# Patient Record
Sex: MALE | Race: BLACK OR AFRICAN AMERICAN | NOT HISPANIC OR LATINO | Employment: FULL TIME | URBAN - METROPOLITAN AREA
[De-identification: names, ages, dates, MRNs, and addresses within clinical notes are randomized per-mention and may not be internally consistent; named-entity substitution may affect disease eponyms.]

---

## 2021-05-10 ENCOUNTER — OFFICE VISIT (OUTPATIENT)
Dept: INTERNAL MEDICINE | Facility: CLINIC | Age: 41
End: 2021-05-10

## 2021-05-10 VITALS
HEART RATE: 76 BPM | TEMPERATURE: 97.3 F | DIASTOLIC BLOOD PRESSURE: 96 MMHG | HEIGHT: 70 IN | BODY MASS INDEX: 45.1 KG/M2 | SYSTOLIC BLOOD PRESSURE: 148 MMHG | OXYGEN SATURATION: 96 % | WEIGHT: 315 LBS

## 2021-05-10 DIAGNOSIS — Z00.00 PHYSICAL EXAM, ANNUAL: Primary | ICD-10-CM

## 2021-05-10 DIAGNOSIS — E66.01 MORBID (SEVERE) OBESITY DUE TO EXCESS CALORIES (HCC): ICD-10-CM

## 2021-05-10 DIAGNOSIS — I10 ESSENTIAL HYPERTENSION: ICD-10-CM

## 2021-05-10 DIAGNOSIS — Z11.59 NEED FOR HEPATITIS C SCREENING TEST: ICD-10-CM

## 2021-05-10 PROBLEM — J30.2 SEASONAL ALLERGIC RHINITIS: Status: ACTIVE | Noted: 2021-05-10

## 2021-05-10 PROBLEM — K62.5 RECTAL BLEEDING: Status: ACTIVE | Noted: 2020-06-23

## 2021-05-10 PROCEDURE — 99386 PREV VISIT NEW AGE 40-64: CPT | Performed by: FAMILY MEDICINE

## 2021-05-10 RX ORDER — LISINOPRIL 10 MG/1
10 TABLET ORAL DAILY
Qty: 30 TABLET | Refills: 1 | Status: SHIPPED | OUTPATIENT
Start: 2021-05-10 | End: 2021-06-12 | Stop reason: SDUPTHER

## 2021-05-10 NOTE — PATIENT INSTRUCTIONS
"https://www.nhlbi.nih.gov/files/docs/public/heart/dash_brief.pdf\">   DASH Eating Plan  DASH stands for Dietary Approaches to Stop Hypertension. The DASH eating plan is a healthy eating plan that has been shown to:  · Reduce high blood pressure (hypertension).  · Reduce your risk for type 2 diabetes, heart disease, and stroke.  · Help with weight loss.  What are tips for following this plan?  Reading food labels  · Check food labels for the amount of salt (sodium) per serving. Choose foods with less than 5 percent of the Daily Value of sodium. Generally, foods with less than 300 milligrams (mg) of sodium per serving fit into this eating plan.  · To find whole grains, look for the word \"whole\" as the first word in the ingredient list.  Shopping  · Buy products labeled as \"low-sodium\" or \"no salt added.\"  · Buy fresh foods. Avoid canned foods and pre-made or frozen meals.  Cooking  · Avoid adding salt when cooking. Use salt-free seasonings or herbs instead of table salt or sea salt. Check with your health care provider or pharmacist before using salt substitutes.  · Do not hooks foods. Cook foods using healthy methods such as baking, boiling, grilling, roasting, and broiling instead.  · Cook with heart-healthy oils, such as olive, canola, avocado, soybean, or sunflower oil.  Meal planning    · Eat a balanced diet that includes:  ? 4 or more servings of fruits and 4 or more servings of vegetables each day. Try to fill one-half of your plate with fruits and vegetables.  ? 6-8 servings of whole grains each day.  ? Less than 6 oz (170 g) of lean meat, poultry, or fish each day. A 3-oz (85-g) serving of meat is about the same size as a deck of cards. One egg equals 1 oz (28 g).  ? 2-3 servings of low-fat dairy each day. One serving is 1 cup (237 mL).  ? 1 serving of nuts, seeds, or beans 5 times each week.  ? 2-3 servings of heart-healthy fats. Healthy fats called omega-3 fatty acids are found in foods such as walnuts, " flaxseeds, fortified milks, and eggs. These fats are also found in cold-water fish, such as sardines, salmon, and mackerel.  · Limit how much you eat of:  ? Canned or prepackaged foods.  ? Food that is high in trans fat, such as some fried foods.  ? Food that is high in saturated fat, such as fatty meat.  ? Desserts and other sweets, sugary drinks, and other foods with added sugar.  ? Full-fat dairy products.  · Do not salt foods before eating.  · Do not eat more than 4 egg yolks a week.  · Try to eat at least 2 vegetarian meals a week.  · Eat more home-cooked food and less restaurant, buffet, and fast food.  Lifestyle  · When eating at a restaurant, ask that your food be prepared with less salt or no salt, if possible.  · If you drink alcohol:  ? Limit how much you use to:  § 0-1 drink a day for women who are not pregnant.  § 0-2 drinks a day for men.  ? Be aware of how much alcohol is in your drink. In the [place] one drink equals one 12 oz bottle of beer (355 mL), one 5 oz glass of wine (148 mL), or one 1½ oz glass of hard liquor (44 mL).  General information  · Avoid eating more than 2,300 mg of salt a day. If you have hypertension, you may need to reduce your sodium intake to 1,500 mg a day.  · Work with your health care provider to maintain a healthy body weight or to lose weight. Ask what an ideal weight is for you.  · Get at least 30 minutes of exercise that causes your heart to beat faster (aerobic exercise) most days of the week. Activities may include walking, swimming, or biking.  · Work with your health care provider or dietitian to adjust your eating plan to your individual calorie needs.  What foods should I eat?  Fruits  All fresh, dried, or frozen fruit. Canned fruit in natural juice (without added sugar).  Vegetables  Fresh or frozen vegetables (raw, steamed, roasted, or grilled). Low-sodium or reduced-sodium tomato and vegetable juice. Low-sodium or reduced-sodium tomato sauce and tomato paste.  Low-sodium or reduced-sodium canned vegetables.  Grains  Whole-grain or whole-wheat bread. Whole-grain or whole-wheat pasta. Brown rice. Oatmeal. Quinoa. Bulgur. Whole-grain and low-sodium cereals. Clara bread. Low-fat, low-sodium crackers. Whole-wheat flour tortillas.  Meats and other proteins  Skinless chicken or turkey. Ground chicken or turkey. Pork with fat trimmed off. Fish and seafood. Egg whites. Dried beans, peas, or lentils. Unsalted nuts, nut butters, and seeds. Unsalted canned beans. Lean cuts of beef with fat trimmed off. Low-sodium, lean precooked or cured meat, such as sausages or meat loaves.  Dairy  Low-fat (1%) or fat-free (skim) milk. Reduced-fat, low-fat, or fat-free cheeses. Nonfat, low-sodium ricotta or cottage cheese. Low-fat or nonfat yogurt. Low-fat, low-sodium cheese.  Fats and oils  Soft margarine without trans fats. Vegetable oil. Reduced-fat, low-fat, or light mayonnaise and salad dressings (reduced-sodium). Canola, safflower, olive, avocado, soybean, and sunflower oils. Avocado.  Seasonings and condiments  Herbs. Spices. Seasoning mixes without salt.  Other foods  Unsalted popcorn and pretzels. Fat-free sweets.  The items listed above may not be a complete list of foods and beverages you can eat. Contact a dietitian for more information.  What foods should I avoid?  Fruits  Canned fruit in a light or heavy syrup. Fried fruit. Fruit in cream or butter sauce.  Vegetables  Creamed or fried vegetables. Vegetables in a cheese sauce. Regular canned vegetables (not low-sodium or reduced-sodium). Regular canned tomato sauce and paste (not low-sodium or reduced-sodium). Regular tomato and vegetable juice (not low-sodium or reduced-sodium). Pickles. Olives.  Grains  Baked goods made with fat, such as croissants, muffins, or some breads. Dry pasta or rice meal packs.  Meats and other proteins  Fatty cuts of meat. Ribs. Fried meat. Melton. Bologna, salami, and other precooked or cured meats, such as  sausages or meat loaves. Fat from the back of a pig (fatback). Bratwurst. Salted nuts and seeds. Canned beans with added salt. Canned or smoked fish. Whole eggs or egg yolks. Chicken or turkey with skin.  Dairy  Whole or 2% milk, cream, and half-and-half. Whole or full-fat cream cheese. Whole-fat or sweetened yogurt. Full-fat cheese. Nondairy creamers. Whipped toppings. Processed cheese and cheese spreads.  Fats and oils  Butter. Stick margarine. Lard. Shortening. Ghee. Melton fat. Tropical oils, such as coconut, palm kernel, or palm oil.  Seasonings and condiments  Onion salt, garlic salt, seasoned salt, table salt, and sea salt. Worcestershire sauce. Tartar sauce. Barbecue sauce. Teriyaki sauce. Soy sauce, including reduced-sodium. Steak sauce. Canned and packaged gravies. Fish sauce. Oyster sauce. Cocktail sauce. Store-bought horseradish. Ketchup. Mustard. Meat flavorings and tenderizers. Bouillon cubes. Hot sauces. Pre-made or packaged marinades. Pre-made or packaged taco seasonings. Relishes. Regular salad dressings.  Other foods  Salted popcorn and pretzels.  The items listed above may not be a complete list of foods and beverages you should avoid. Contact a dietitian for more information.  Where to find more information  · National Heart, Lung, and Blood [place]: www.nhlbi.nih.gov  · American Heart Association: www.heart.org  · Academy of Nutrition and Dietetics: www.eatright.org  · National Kidney Foundation: www.kidney.org  Summary  · The DASH eating plan is a healthy eating plan that has been shown to reduce high blood pressure (hypertension). It may also reduce your risk for type 2 diabetes, heart disease, and stroke.  · When on the DASH eating plan, aim to eat more fresh fruits and vegetables, whole grains, lean proteins, low-fat dairy, and heart-healthy fats.  · With the DASH eating plan, you should limit salt (sodium) intake to 2,300 mg a day. If you have hypertension, you may need to reduce your  sodium intake to 1,500 mg a day.  · Work with your health care provider or dietitian to adjust your eating plan to your individual calorie needs.  This information is not intended to replace advice given to you by your health care provider. Make sure you discuss any questions you have with your health care provider.  Document Revised: 11/20/2020 Document Reviewed: 11/20/2020  [facility] Patient Education © 2021 [employer]

## 2021-05-10 NOTE — PROGRESS NOTES
Subjective   Farzad Collins is a 41 y.o. male.   Chief Complaint   Patient presents with   • Allergic Rhinitis   • Annual Exam       History of Present Illness     This is a new patient in our office.  He is here for complete physical exam.      #1 CPE-he has no complaints.    He has a history of rectal bleeding.  He had colonoscopy in July 2020 done.  It was positive for internal hemorrhoids.  Patient says that as long as he keeps high-fiber diet he has no problems.  Allergic rhinitis-he has seasonal allergies.  Symptoms are worse in spring and fall.  He uses Zyrtec and Flonase as needed seasonally.  He takes no prescription medications  Over-the-counter he takes fiber pills, Zyrtec and Flonase.  He is not allergic to any medications.  He does not use tobacco products.  He does not drink alcohol.  He used to drink heavily in his 20s.  He does not do it since he is 25.  He does not use drugs.  He does not exercise regularly.  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  Dental appointments every 6 months.  Last eye exam was in 2020.  He wears glasses.    He had tetanus vaccine in 2019.  Colonoscopy July 2020.    2.  Hypertension-he has no history of hypertension.  He was told that his blood pressure was elevated when he had colonoscopy in July 2020.  Blood pressure is at 148/96.  No chest pain, no shortness of breath, no lightheadedness.    For more history please see health history form which was reviewed by me and will be scanned.    The following portions of the patient's history were reviewed and updated as appropriate: allergies, current medications, past family history, past medical history, past social history, past surgical history and problem list.    Review of Systems   Constitutional: Negative for chills and fever.   Respiratory: Negative for cough and shortness of breath.    Cardiovascular: Negative for chest pain and palpitations.   Genitourinary: Negative for hematuria.   Neurological: Negative  for light-headedness.   Psychiatric/Behavioral: Negative.          Objective   Wt Readings from Last 3 Encounters:   05/10/21 (!) 143 kg (315 lb)      Vitals:    05/10/21 0925   BP: 148/96   Pulse: 76   Temp: 97.3 °F (36.3 °C)   SpO2: 96%     Temp Readings from Last 3 Encounters:   05/10/21 97.3 °F (36.3 °C)     BP Readings from Last 3 Encounters:   05/10/21 148/96     Pulse Readings from Last 3 Encounters:   05/10/21 76     Body mass index is 45.2 kg/m².    Physical Exam  Vitals reviewed.   Constitutional:       General: He is not in acute distress.     Appearance: He is well-developed. He is obese. He is not diaphoretic.   HENT:      Head: Normocephalic and atraumatic. Hair is normal.      Right Ear: Hearing, tympanic membrane, ear canal and external ear normal.      Left Ear: Hearing, tympanic membrane, ear canal and external ear normal.   Eyes:      General: Lids are normal. No scleral icterus.        Right eye: No discharge.         Left eye: No discharge.      Extraocular Movements:      Right eye: Normal extraocular motion and no nystagmus.      Left eye: Normal extraocular motion and no nystagmus.      Conjunctiva/sclera: Conjunctivae normal.      Pupils: Pupils are equal, round, and reactive to light.   Neck:      Thyroid: No thyromegaly.      Vascular: No JVD.   Cardiovascular:      Rate and Rhythm: Normal rate and regular rhythm.      Pulses: Normal pulses.      Heart sounds: Normal heart sounds. No murmur heard.   No gallop.    Pulmonary:      Effort: Pulmonary effort is normal. No respiratory distress.      Breath sounds: Normal breath sounds. No wheezing or rales.   Chest:      Chest wall: No tenderness.   Abdominal:      General: There is no distension.      Palpations: Abdomen is soft. There is no mass.      Tenderness: There is no abdominal tenderness. There is no guarding.      Hernia: No hernia is present. There is no hernia in the left inguinal area or right inguinal area.   Genitourinary:      Testes:         Right: Mass, tenderness or swelling not present.         Left: Mass, tenderness or swelling not present.   Musculoskeletal:         General: No tenderness or deformity. Normal range of motion.      Cervical back: Normal range of motion and neck supple.   Lymphadenopathy:      Cervical: No cervical adenopathy.      Lower Body: No right inguinal adenopathy. No left inguinal adenopathy.   Skin:     General: Skin is warm and dry.      Findings: No rash.   Neurological:      Mental Status: He is alert and oriented to person, place, and time.      Cranial Nerves: No cranial nerve deficit.      Motor: No abnormal muscle tone.      Coordination: Coordination normal.      Deep Tendon Reflexes: Reflexes are normal and symmetric. Reflexes normal.   Psychiatric:         Behavior: Behavior normal.         Thought Content: Thought content normal.         Judgment: Judgment normal.         Assessment/Plan   Diagnoses and all orders for this visit:    1. Physical exam, annual (Primary)  -     CBC (No Diff)  -     Comprehensive Metabolic Panel  -     Lipid Panel With LDL / HDL Ratio  -     Thyroid Cascade Profile  -     Urinalysis With Microscopic If Indicated (No Culture) - Urine, Clean Catch  -     Vitamin D 25 Hydroxy    2. Essential hypertension    3. Morbid (severe) obesity due to excess calories (CMS/HCC)    4. Need for hepatitis C screening test  -     Hepatitis C Antibody    Other orders  -     lisinopril (PRINIVIL,ZESTRIL) 10 MG tablet; Take 1 tablet by mouth Daily.  Dispense: 30 tablet; Refill: 1        #1 CPE-preventing counseling provided.  We are ordering labs.  Patient is up-to-date with cancer screening.  His major risk factor is morbid obesity.  It increases risk for developing diabetes and heart disease.  Patient is advised to stop keto diet.  It is not healthy long term and he tried it many times losing and gaining weight.  He was never able to maintain it.  I recommend that he joints weight  watchers.  Dietitian offered.  He is advised to start to exercise at least 30 minutes a day, 5 days a week.  Continue regular dental appointments every 6 months.    2.  Hypertension-new and uncontrolled problem.  I rechecked his blood pressure at the end of the visit and it stayed elevated.  We are starting lisinopril at 10 mg a day.  Side effects discussed.  Information DASH diet provided.  Weight loss will be beneficial.  Follow-up in 1 month.  Will need EKG at next office visit.    Both patient and I were wearing masks throughout the whole appointment.

## 2021-05-11 LAB
25(OH)D3+25(OH)D2 SERPL-MCNC: 17.7 NG/ML (ref 30–100)
ALBUMIN SERPL-MCNC: 4.1 G/DL (ref 3.5–5.2)
ALBUMIN/GLOB SERPL: 1.6 G/DL
ALP SERPL-CCNC: 84 U/L (ref 39–117)
ALT SERPL-CCNC: 21 U/L (ref 1–41)
APPEARANCE UR: CLEAR
AST SERPL-CCNC: 14 U/L (ref 1–40)
BILIRUB SERPL-MCNC: 0.4 MG/DL (ref 0–1.2)
BILIRUB UR QL STRIP: NEGATIVE
BUN SERPL-MCNC: 10 MG/DL (ref 6–20)
BUN/CREAT SERPL: 10.1 (ref 7–25)
CALCIUM SERPL-MCNC: 9.1 MG/DL (ref 8.6–10.5)
CHLORIDE SERPL-SCNC: 103 MMOL/L (ref 98–107)
CHOLEST SERPL-MCNC: 175 MG/DL (ref 0–200)
CO2 SERPL-SCNC: 23.2 MMOL/L (ref 22–29)
COLOR UR: ABNORMAL
CREAT SERPL-MCNC: 0.99 MG/DL (ref 0.76–1.27)
ERYTHROCYTE [DISTWIDTH] IN BLOOD BY AUTOMATED COUNT: 13.2 % (ref 12.3–15.4)
GLOBULIN SER CALC-MCNC: 2.6 GM/DL
GLUCOSE SERPL-MCNC: 95 MG/DL (ref 65–99)
GLUCOSE UR QL: NEGATIVE
HCT VFR BLD AUTO: 47.6 % (ref 37.5–51)
HCV AB S/CO SERPL IA: <0.1 S/CO RATIO (ref 0–0.9)
HDLC SERPL-MCNC: 36 MG/DL (ref 40–60)
HGB BLD-MCNC: 15.7 G/DL (ref 13–17.7)
HGB UR QL STRIP: NEGATIVE
KETONES UR QL STRIP: NEGATIVE
LDLC SERPL CALC-MCNC: 126 MG/DL (ref 0–100)
LDLC/HDLC SERPL: 3.49 {RATIO}
LEUKOCYTE ESTERASE UR QL STRIP: NEGATIVE
MCH RBC QN AUTO: 27.9 PG (ref 26.6–33)
MCHC RBC AUTO-ENTMCNC: 33 G/DL (ref 31.5–35.7)
MCV RBC AUTO: 84.7 FL (ref 79–97)
NITRITE UR QL STRIP: NEGATIVE
PH UR STRIP: 5.5 [PH] (ref 5–8)
PLATELET # BLD AUTO: 330 10*3/MM3 (ref 140–450)
POTASSIUM SERPL-SCNC: 4.5 MMOL/L (ref 3.5–5.2)
PROT SERPL-MCNC: 6.7 G/DL (ref 6–8.5)
PROT UR QL STRIP: ABNORMAL
RBC # BLD AUTO: 5.62 10*6/MM3 (ref 4.14–5.8)
SODIUM SERPL-SCNC: 135 MMOL/L (ref 136–145)
SP GR UR: ABNORMAL (ref 1–1.03)
TRIGL SERPL-MCNC: 67 MG/DL (ref 0–150)
TSH SERPL DL<=0.005 MIU/L-ACNC: 1.85 UIU/ML (ref 0.45–4.5)
UROBILINOGEN UR STRIP-MCNC: ABNORMAL MG/DL
VLDLC SERPL CALC-MCNC: 13 MG/DL (ref 5–40)
WBC # BLD AUTO: 6.43 10*3/MM3 (ref 3.4–10.8)

## 2021-06-14 RX ORDER — LISINOPRIL 10 MG/1
10 TABLET ORAL DAILY
Qty: 30 TABLET | Refills: 1 | Status: SHIPPED | OUTPATIENT
Start: 2021-06-14 | End: 2021-06-21 | Stop reason: SDUPTHER

## 2021-06-21 ENCOUNTER — OFFICE VISIT (OUTPATIENT)
Dept: INTERNAL MEDICINE | Facility: CLINIC | Age: 41
End: 2021-06-21

## 2021-06-21 VITALS
TEMPERATURE: 98.2 F | HEART RATE: 69 BPM | DIASTOLIC BLOOD PRESSURE: 90 MMHG | SYSTOLIC BLOOD PRESSURE: 140 MMHG | HEIGHT: 70 IN | BODY MASS INDEX: 44.95 KG/M2 | WEIGHT: 314 LBS | OXYGEN SATURATION: 97 %

## 2021-06-21 DIAGNOSIS — I10 ESSENTIAL HYPERTENSION: Primary | ICD-10-CM

## 2021-06-21 DIAGNOSIS — E55.9 VITAMIN D DEFICIENCY: ICD-10-CM

## 2021-06-21 PROCEDURE — 99214 OFFICE O/P EST MOD 30 MIN: CPT | Performed by: FAMILY MEDICINE

## 2021-06-21 RX ORDER — LISINOPRIL 20 MG/1
20 TABLET ORAL DAILY
Qty: 90 TABLET | Refills: 1 | Status: SHIPPED | OUTPATIENT
Start: 2021-06-21 | End: 2021-12-21

## 2021-06-21 NOTE — PROGRESS NOTES
Subjective   Farzad Collins is a 41 y.o. male.   Chief Complaint   Patient presents with   • Hypertension   • Vitamin D Deficiency       History of Present Illness     #1 hypertension-I started patient on lisinopril 10 mg a day at last office visit.  He takes it every day.  He has no side effects.  No cough.  He has no chest pain, no shortness of breath, no lightheadedness.  He started walking more.  He walks 2-3 times a week for about 30 minutes.  He decreased amount of salt in his diet.  Creatinine is normal at 0.99, GFR at 83.    2.  Vitamin D deficiency-vitamin D is low at 17.7.  He is on no vitamin D.    The following portions of the patient's history were reviewed and updated as appropriate: allergies, current medications, past medical history, past social history and problem list.    Review of Systems   Respiratory: Negative for cough and shortness of breath.    Cardiovascular: Negative for chest pain and palpitations.   Musculoskeletal: Negative for neck pain.   Neurological: Negative for headaches.         Objective   Wt Readings from Last 3 Encounters:   06/21/21 (!) 142 kg (314 lb)   05/10/21 (!) 143 kg (315 lb)      Vitals:    06/21/21 1134   BP: 140/90   Pulse: 69   Temp: 98.2 °F (36.8 °C)   SpO2: 97%     Temp Readings from Last 3 Encounters:   06/21/21 98.2 °F (36.8 °C)   05/10/21 97.3 °F (36.3 °C)     BP Readings from Last 3 Encounters:   06/21/21 140/90   05/10/21 148/96     Pulse Readings from Last 3 Encounters:   06/21/21 69   05/10/21 76     Body mass index is 45.05 kg/m².    Physical Exam  Constitutional:       Appearance: He is well-developed. He is obese.   HENT:      Head: Normocephalic and atraumatic.   Neck:      Thyroid: No thyromegaly.      Vascular: No carotid bruit.   Cardiovascular:      Rate and Rhythm: Normal rate and regular rhythm.      Heart sounds: Normal heart sounds.   Pulmonary:      Effort: Pulmonary effort is normal.      Breath sounds: Normal breath sounds.    Musculoskeletal:      Cervical back: Neck supple.   Skin:     General: Skin is warm and dry.   Neurological:      Mental Status: He is alert and oriented to person, place, and time.   Psychiatric:         Behavior: Behavior normal.         Assessment/Plan   Diagnoses and all orders for this visit:    1. Essential hypertension (Primary)  -     Basic Metabolic Panel; Future    2. Vitamin D deficiency  -     Vitamin D 25 Hydroxy; Future    Other orders  -     lisinopril (PRINIVIL,ZESTRIL) 20 MG tablet; Take 1 tablet by mouth Daily.  Dispense: 90 tablet; Refill: 1        #1 hypertension-uncontrolled.  We are increasing dose of lisinopril to 20 mg a day.  He is encouraged to increase exercise.  Either go 5 times a week for 30 minutes, or for 60 minutes 2-3 times a week.  Follow-up in 6 months.  Nonfasting labs prior to office visit.      #2 vitamin D deficiency-uncontrolled.  Start vitamin D3 at 2000 units a day.  Labs in 6 months before office visit.

## 2021-12-13 DIAGNOSIS — I10 ESSENTIAL HYPERTENSION: ICD-10-CM

## 2021-12-13 DIAGNOSIS — E55.9 VITAMIN D DEFICIENCY: ICD-10-CM

## 2021-12-15 LAB
25(OH)D3+25(OH)D2 SERPL-MCNC: 28.6 NG/ML (ref 30–100)
BUN SERPL-MCNC: 10 MG/DL (ref 6–24)
BUN/CREAT SERPL: 10 (ref 9–20)
CALCIUM SERPL-MCNC: 9.3 MG/DL (ref 8.7–10.2)
CHLORIDE SERPL-SCNC: 105 MMOL/L (ref 96–106)
CO2 SERPL-SCNC: 22 MMOL/L (ref 20–29)
CREAT SERPL-MCNC: 0.97 MG/DL (ref 0.76–1.27)
GLUCOSE SERPL-MCNC: 101 MG/DL (ref 65–99)
POTASSIUM SERPL-SCNC: 4.3 MMOL/L (ref 3.5–5.2)
SODIUM SERPL-SCNC: 138 MMOL/L (ref 134–144)

## 2021-12-21 ENCOUNTER — OFFICE VISIT (OUTPATIENT)
Dept: INTERNAL MEDICINE | Facility: CLINIC | Age: 41
End: 2021-12-21

## 2021-12-21 VITALS
WEIGHT: 308 LBS | BODY MASS INDEX: 44.09 KG/M2 | DIASTOLIC BLOOD PRESSURE: 90 MMHG | HEART RATE: 89 BPM | TEMPERATURE: 98 F | SYSTOLIC BLOOD PRESSURE: 140 MMHG | HEIGHT: 70 IN | OXYGEN SATURATION: 98 %

## 2021-12-21 DIAGNOSIS — G56.03 BILATERAL CARPAL TUNNEL SYNDROME: ICD-10-CM

## 2021-12-21 DIAGNOSIS — I10 ESSENTIAL HYPERTENSION: Primary | ICD-10-CM

## 2021-12-21 DIAGNOSIS — E55.9 VITAMIN D DEFICIENCY: ICD-10-CM

## 2021-12-21 PROCEDURE — 99214 OFFICE O/P EST MOD 30 MIN: CPT | Performed by: FAMILY MEDICINE

## 2021-12-21 RX ORDER — AMLODIPINE BESYLATE 10 MG/1
10 TABLET ORAL DAILY
Qty: 30 TABLET | Refills: 1 | Status: SHIPPED | OUTPATIENT
Start: 2021-12-21 | End: 2022-01-12

## 2021-12-21 NOTE — PROGRESS NOTES
Subjective   Farzad Collins is a 41 y.o. male.   Chief Complaint   Patient presents with   • Hypertension   • Vitamin D Deficiency       History of Present Illness     1.hypertension-at last office visit we increased dose of lisinopril to 20 mg a day.  He takes it every day.  He reports that since restarting lisinopril and he has a dry cough on and off, on average 3-4 times a week.  He has no chest pain, no shortness of breath, no lightheadedness.  He does not exercise regularly.  Creatinine at 0.97, GFR 97.    2.  Vitamin D deficiency-patient takes vitamin D3 at 2000 units a day.  He takes it on average 3-4 times a week.  He started it after last office visit.  Vitamin D is at 28.6 from 17.7.    3.  Numbness and tingling in both hands-he wore his brace at night for 6 years.  It was started by his previous PCP.  It helps, but he still wakes up in the mornings with numbness and tingling in both hands.  He has to shake it to improve it.  He is tired of it and would like to be referred to hand surgeon.    The following portions of the patient's history were reviewed and updated as appropriate: allergies, current medications, past family history, past medical history, past social history, past surgical history and problem list.    Review of Systems   Respiratory: Negative for shortness of breath.    Cardiovascular: Negative for chest pain and palpitations.   Musculoskeletal: Negative for neck pain.   Neurological: Negative for light-headedness and headaches.         Objective   Wt Readings from Last 3 Encounters:   06/21/21 (!) 142 kg (314 lb)   05/10/21 (!) 143 kg (315 lb)    There were no vitals filed for this visit.  Temp Readings from Last 3 Encounters:   06/21/21 98.2 °F (36.8 °C)   05/10/21 97.3 °F (36.3 °C)     BP Readings from Last 3 Encounters:   06/21/21 140/90   05/10/21 148/96     Pulse Readings from Last 3 Encounters:   06/21/21 69   05/10/21 76     There is no height or weight on file to calculate  BMI.    Physical Exam  Constitutional:       Appearance: He is well-developed. He is obese.   HENT:      Head: Normocephalic and atraumatic.   Neck:      Thyroid: No thyromegaly.      Vascular: No carotid bruit.   Cardiovascular:      Rate and Rhythm: Normal rate and regular rhythm.      Heart sounds: Normal heart sounds.   Pulmonary:      Effort: Pulmonary effort is normal.      Breath sounds: Normal breath sounds.   Musculoskeletal:      Cervical back: Neck supple.      Comments: Muscle strength 5/5 x UE BL.  Tinel negative bilateral.  Phalen positive left more than right.   Skin:     General: Skin is warm and dry.   Neurological:      Mental Status: He is alert and oriented to person, place, and time.   Psychiatric:         Behavior: Behavior normal.         Assessment/Plan   Diagnoses and all orders for this visit:    1. Essential hypertension (Primary)    2. Vitamin D deficiency    3. Bilateral carpal tunnel syndrome  -     Ambulatory Referral to Hand Surgery    Other orders  -     amLODIPine (NORVASC) 10 MG tablet; Take 1 tablet by mouth Daily.  Dispense: 30 tablet; Refill: 1        1.  Hypertension-uncontrolled and patient has cough on lisinopril.  We are going to stop lisinopril. Will start amlodipine at 10 mg a day.  He is advised to exercise at least 30 to 60 minutes a day, 5 days a week.  He is advised to work on weight loss.  To decrease sweets and carbs.  Follow-up in 1 month.  2.  Vitamin D deficiency - uncontrolled.  He is advised to take vitamin D3 2000 units a day, every day.  Labs in 6 months before office visit.  3.  Carpal tunnel syndrome bilateral-referral to hand surgeon.

## 2021-12-28 RX ORDER — LISINOPRIL 20 MG/1
TABLET ORAL
Qty: 90 TABLET | Refills: 1 | Status: SHIPPED | OUTPATIENT
Start: 2021-12-28 | End: 2022-01-25 | Stop reason: ALTCHOICE

## 2022-01-12 RX ORDER — AMLODIPINE BESYLATE 10 MG/1
TABLET ORAL
Qty: 30 TABLET | Refills: 1 | Status: SHIPPED | OUTPATIENT
Start: 2022-01-12 | End: 2022-01-25 | Stop reason: SDUPTHER

## 2022-01-25 ENCOUNTER — OFFICE VISIT (OUTPATIENT)
Dept: INTERNAL MEDICINE | Facility: CLINIC | Age: 42
End: 2022-01-25

## 2022-01-25 VITALS
OXYGEN SATURATION: 97 % | BODY MASS INDEX: 44.21 KG/M2 | TEMPERATURE: 97.8 F | HEART RATE: 69 BPM | DIASTOLIC BLOOD PRESSURE: 84 MMHG | SYSTOLIC BLOOD PRESSURE: 132 MMHG | HEIGHT: 70 IN | WEIGHT: 308.8 LBS

## 2022-01-25 DIAGNOSIS — I10 ESSENTIAL HYPERTENSION: Primary | ICD-10-CM

## 2022-01-25 DIAGNOSIS — F51.02 ADJUSTMENT INSOMNIA: ICD-10-CM

## 2022-01-25 PROCEDURE — 99213 OFFICE O/P EST LOW 20 MIN: CPT | Performed by: FAMILY MEDICINE

## 2022-01-25 RX ORDER — AMLODIPINE BESYLATE 10 MG/1
10 TABLET ORAL DAILY
Qty: 90 TABLET | Refills: 1 | Status: SHIPPED | OUTPATIENT
Start: 2022-01-25 | End: 2022-08-15 | Stop reason: SDUPTHER

## 2022-01-25 NOTE — PROGRESS NOTES
Subjective   Farzad Collins is a 41 y.o. male.   Chief Complaint   Patient presents with   • Hypertension   • Insomnia       History of Present Illness     1.  Hypertension-at last office visit we stopped lisinopril because of the cough.  Cough resolved after discontinuation of lisinopril.  We started patient amlodipine at 10 mg a day.  He takes daily.  He has no chest pain, no shortness of breath, no lightheadedness.  No lower extremity edema.  He did not start to exercise regularly yet.    2.  Insomnia-patient is a .  He is under a lot of stress.  On average twice a week he has problems with falling asleep.  He goes to bed around 10 or 10.30pm and cannot fall asleep till 1.30 or 2 AM.  He tried watching TV , reading a book, but it did not help.  He wakes up at 7.15.  On other nights when he is not stressed he sleeps without any problems.    When he has no problems with falling asleep he wakes up rested.  He tried Benadryl, but it did not help.    The following portions of the patient's history were reviewed and updated as appropriate: allergies, current medications, past medical history, past social history and problem list.    Review of Systems   Respiratory: Negative for shortness of breath.    Cardiovascular: Negative for chest pain and palpitations.   Musculoskeletal: Negative for neck pain.   Neurological: Negative for headaches.         Objective   Wt Readings from Last 3 Encounters:   01/25/22 (!) 140 kg (308 lb 12.8 oz)   12/21/21 (!) 140 kg (308 lb)   06/21/21 (!) 142 kg (314 lb)      Vitals:    01/25/22 1514   BP: 132/84   Pulse: 69   Temp: 97.8 °F (36.6 °C)   SpO2: 97%     Temp Readings from Last 3 Encounters:   01/25/22 97.8 °F (36.6 °C)   12/21/21 98 °F (36.7 °C)   06/21/21 98.2 °F (36.8 °C)     BP Readings from Last 3 Encounters:   01/25/22 132/84   12/21/21 140/90   06/21/21 140/90     Pulse Readings from Last 3 Encounters:   01/25/22 69   12/21/21 89   06/21/21 69     Body mass index is  44.31 kg/m².    Physical Exam  Constitutional:       Appearance: He is well-developed. He is obese.   HENT:      Head: Normocephalic and atraumatic.   Neck:      Thyroid: No thyromegaly.      Vascular: No carotid bruit.   Cardiovascular:      Rate and Rhythm: Normal rate and regular rhythm.      Heart sounds: Normal heart sounds.   Pulmonary:      Effort: Pulmonary effort is normal.      Breath sounds: Normal breath sounds.   Musculoskeletal:      Cervical back: Neck supple.   Skin:     General: Skin is warm and dry.   Neurological:      Mental Status: He is alert and oriented to person, place, and time.   Psychiatric:         Behavior: Behavior normal.         Assessment/Plan   Diagnoses and all orders for this visit:    1. Essential hypertension (Primary)    2. Adjustment insomnia    Other orders  -     amLODIPine (NORVASC) 10 MG tablet; Take 1 tablet by mouth Daily.  Dispense: 90 tablet; Refill: 1        1 HTN-continue current treatment.  Will work on weight loss.  He is planning to start my fitness pal challenge with his wife and family members.  Follow-up in 5 months.  2 insomnia-situational.  We discussed behavioral therapy, but he prefers to try medication.  He will start with melatonin 5 to 10 mg, 1 hour before bedtime.  If it does not help he will see me in 1 month, otherwise in 5 months.

## 2022-06-10 DIAGNOSIS — E78.00 ELEVATED LDL CHOLESTEROL LEVEL: ICD-10-CM

## 2022-06-10 DIAGNOSIS — I10 ESSENTIAL HYPERTENSION: Primary | ICD-10-CM

## 2022-08-15 ENCOUNTER — OFFICE VISIT (OUTPATIENT)
Dept: INTERNAL MEDICINE | Facility: CLINIC | Age: 42
End: 2022-08-15

## 2022-08-15 VITALS
OXYGEN SATURATION: 97 % | DIASTOLIC BLOOD PRESSURE: 80 MMHG | SYSTOLIC BLOOD PRESSURE: 144 MMHG | HEIGHT: 70 IN | BODY MASS INDEX: 45.1 KG/M2 | TEMPERATURE: 97.8 F | WEIGHT: 315 LBS | HEART RATE: 92 BPM

## 2022-08-15 DIAGNOSIS — F51.02 ADJUSTMENT INSOMNIA: ICD-10-CM

## 2022-08-15 DIAGNOSIS — E66.01 MORBID (SEVERE) OBESITY DUE TO EXCESS CALORIES: ICD-10-CM

## 2022-08-15 DIAGNOSIS — I10 ESSENTIAL HYPERTENSION: Primary | ICD-10-CM

## 2022-08-15 DIAGNOSIS — E55.9 VITAMIN D DEFICIENCY: ICD-10-CM

## 2022-08-15 PROCEDURE — 99214 OFFICE O/P EST MOD 30 MIN: CPT | Performed by: FAMILY MEDICINE

## 2022-08-15 RX ORDER — OLMESARTAN MEDOXOMIL 20 MG/1
10 TABLET ORAL DAILY
Qty: 30 TABLET | Refills: 1 | Status: SHIPPED | OUTPATIENT
Start: 2022-08-15 | End: 2022-12-09 | Stop reason: SDUPTHER

## 2022-08-15 RX ORDER — TRAZODONE HYDROCHLORIDE 50 MG/1
50 TABLET ORAL NIGHTLY PRN
Qty: 30 TABLET | Refills: 1 | Status: SHIPPED | OUTPATIENT
Start: 2022-08-15 | End: 2022-09-07 | Stop reason: SDUPTHER

## 2022-08-15 RX ORDER — AMLODIPINE BESYLATE 10 MG/1
10 TABLET ORAL DAILY
Qty: 90 TABLET | Refills: 1 | Status: SHIPPED | OUTPATIENT
Start: 2022-08-15 | End: 2022-12-09 | Stop reason: SDUPTHER

## 2022-08-15 NOTE — PROGRESS NOTES
Subjective   Farzad Clolins is a 42 y.o. male.   Chief Complaint   Patient presents with   • Hypertension   • Insomnia   • Vitamin D Deficiency       History of Present Illness        1.  Hypertension- on amlodipine at 10 mg a day. He takes daily.  He has no chest pain, no shortness of breath, no lightheadedness.  He started walking 2 weeks ago.  He walks 3 times a week for 45 minutes.  He is planning to make dietary changes.  He gained 7 pounds from last office visit.  BMI is at 45.2.    He had cough on lisinopril.     2.  Insomnia-patient is a .  He is under a lot of stress.  On average twice a week he has problems with falling asleep.  He goes to bed around 10 or 10.30pm and cannot fall asleep till 1.30 or 2 AM.  He tried watching TV , reading a book, but it did not help.  He wakes up at 7.15.  On other nights when he is not stressed he sleeps without any problems.    When he has no problems with falling asleep he wakes up rested.  He tried Benadryl, but it did not help.  At last office visit I advised him to use melatonin.  He did it, but it did not work so he stopped it.    3.  Vitamin D deficiency-he takes vitamin D3 2000 units a day.    The following portions of the patient's history were reviewed and updated as appropriate: allergies, current medications, past family history, past medical history, past social history, past surgical history and problem list.    Review of Systems   Respiratory: Negative for shortness of breath.    Cardiovascular: Negative for chest pain and palpitations.   Neurological: Negative for light-headedness.         Objective   Wt Readings from Last 3 Encounters:   08/15/22 (!) 143 kg (315 lb)   01/25/22 (!) 140 kg (308 lb 12.8 oz)   12/21/21 (!) 140 kg (308 lb)      Vitals:    08/15/22 1513   BP: 144/80   Pulse: 92   Temp: 97.8 °F (36.6 °C)   SpO2: 97%     Temp Readings from Last 3 Encounters:   08/15/22 97.8 °F (36.6 °C)   01/25/22 97.8 °F (36.6 °C)   12/21/21 98 °F (36.7  °C)     BP Readings from Last 3 Encounters:   08/15/22 144/80   01/25/22 132/84   12/21/21 140/90     Pulse Readings from Last 3 Encounters:   08/15/22 92   01/25/22 69   12/21/21 89     Body mass index is 45.2 kg/m².    Physical Exam  Constitutional:       Appearance: He is well-developed. He is obese.   HENT:      Head: Normocephalic and atraumatic.   Neck:      Thyroid: No thyromegaly.      Vascular: No carotid bruit.   Cardiovascular:      Rate and Rhythm: Normal rate and regular rhythm.      Heart sounds: Normal heart sounds.   Pulmonary:      Effort: Pulmonary effort is normal.      Breath sounds: Normal breath sounds.   Musculoskeletal:      Cervical back: Neck supple.   Skin:     General: Skin is warm and dry.   Neurological:      Mental Status: He is alert and oriented to person, place, and time.   Psychiatric:         Behavior: Behavior normal.         Assessment & Plan   Diagnoses and all orders for this visit:    1. Essential hypertension (Primary)  -     Comprehensive Metabolic Panel; Future  -     Lipid Panel With LDL / HDL Ratio; Future    2. Adjustment insomnia    3. Vitamin D deficiency  -     Vitamin D 25 Hydroxy; Future    4. Morbid (severe) obesity due to excess calories (HCC)    Other orders  -     amLODIPine (NORVASC) 10 MG tablet; Take 1 tablet by mouth Daily.  Dispense: 90 tablet; Refill: 1  -     traZODone (DESYREL) 50 MG tablet; Take 1 tablet by mouth At Night As Needed for Sleep.  Dispense: 30 tablet; Refill: 1  -     olmesartan (Benicar) 20 MG tablet; Take 0.5 tablets by mouth Daily.  Dispense: 30 tablet; Refill: 1        1.  Hypertension-uncontrolled.  We are adding Benicar 20 mg , he will use half tablet a day.  Follow-up in 1 month.  Labs this week.  2.  Insomnia-uncontrolled.  Discussed behavioral therapy as a first-line treatment.  He prefers to try medication.  We are prescribing trazodone 50 mg to be taken as needed at bedtime.  Follow-up in 1 month.  3.  Vitamin D deficiency-check  labs.  Continue current treatment.  Follow-up in 1 month.  4.  Morbid obesity-increased risk for developing diabetes and heart disease.  Dietary changes discussed.  Smaller portion size, no late night eating, no soda.  Avoid fast food.  Record what/how much you eat and review it weekly adjusting your eating plan.

## 2022-09-07 RX ORDER — TRAZODONE HYDROCHLORIDE 50 MG/1
50 TABLET ORAL NIGHTLY PRN
Qty: 30 TABLET | Refills: 0 | Status: SHIPPED | OUTPATIENT
Start: 2022-09-07

## 2022-11-14 RX ORDER — OLMESARTAN MEDOXOMIL 20 MG/1
TABLET ORAL
Qty: 45 TABLET | Refills: 1 | OUTPATIENT
Start: 2022-11-14

## 2022-12-09 ENCOUNTER — OFFICE VISIT (OUTPATIENT)
Dept: INTERNAL MEDICINE | Facility: CLINIC | Age: 42
End: 2022-12-09

## 2022-12-09 VITALS
HEART RATE: 95 BPM | DIASTOLIC BLOOD PRESSURE: 80 MMHG | OXYGEN SATURATION: 97 % | TEMPERATURE: 97.3 F | BODY MASS INDEX: 45.1 KG/M2 | SYSTOLIC BLOOD PRESSURE: 132 MMHG | WEIGHT: 315 LBS | HEIGHT: 70 IN

## 2022-12-09 DIAGNOSIS — F51.02 ADJUSTMENT INSOMNIA: ICD-10-CM

## 2022-12-09 DIAGNOSIS — E55.9 VITAMIN D DEFICIENCY: ICD-10-CM

## 2022-12-09 DIAGNOSIS — I10 ESSENTIAL HYPERTENSION: Primary | ICD-10-CM

## 2022-12-09 PROCEDURE — 99214 OFFICE O/P EST MOD 30 MIN: CPT | Performed by: FAMILY MEDICINE

## 2022-12-09 RX ORDER — OLMESARTAN MEDOXOMIL 5 MG/1
10 TABLET ORAL DAILY
Qty: 180 TABLET | Refills: 1 | Status: SHIPPED | OUTPATIENT
Start: 2022-12-09 | End: 2023-03-06 | Stop reason: SDUPTHER

## 2022-12-09 RX ORDER — AMLODIPINE BESYLATE 10 MG/1
10 TABLET ORAL DAILY
Qty: 90 TABLET | Refills: 1 | Status: SHIPPED | OUTPATIENT
Start: 2022-12-09 | End: 2023-03-06 | Stop reason: SDUPTHER

## 2022-12-09 NOTE — PROGRESS NOTES
Subjective   Farzad Collins is a 42 y.o. male.   Chief Complaint   Patient presents with   • Hypertension   • Insomnia       History of Present Illness     1.  Hypertension- on amlodipine at 10 mg a day and Benicar 10 mg a day (he takes half tablet of 20 mg). He takes it daily.  He has no chest pain, no shortness of breath, no lightheadedness.  He walks 2 times a week.  He joined a gym.  He is planning to start using it next week.  No labs done prior to office visit.     He had cough on lisinopril.     2.  Insomnia-patient is a .  He is under a lot of stress.  Sometimes he has problems with sleeping.  He tried melatonin, but it did not work.  At last office visit we prescribed trazodone 50 mg.  He takes it on average once or twice a month.  It helps.  He has about 7 hours of sleep.  He wakes up rested.  He has no side effects.  No drowsiness.     3.  Vitamin D deficiency-he takes vitamin D3 2000 units a day.  No labs done prior to office visit.    The following portions of the patient's history were reviewed and updated as appropriate: allergies, current medications, past family history, past medical history, past social history, past surgical history and problem list.    Review of Systems   Respiratory: Negative for shortness of breath.    Cardiovascular: Negative for chest pain and palpitations.   Musculoskeletal: Negative for neck pain.   Neurological: Negative for headaches.         Objective   Wt Readings from Last 3 Encounters:   12/09/22 (!) 143 kg (315 lb)   08/15/22 (!) 143 kg (315 lb)   01/25/22 (!) 140 kg (308 lb 12.8 oz)      Vitals:    12/09/22 0739   BP: 132/80   Pulse: 95   Temp: 97.3 °F (36.3 °C)   SpO2: 97%     Temp Readings from Last 3 Encounters:   12/09/22 97.3 °F (36.3 °C)   08/15/22 97.8 °F (36.6 °C)   01/25/22 97.8 °F (36.6 °C)     BP Readings from Last 3 Encounters:   12/09/22 132/80   08/15/22 144/80   01/25/22 132/84     Pulse Readings from Last 3 Encounters:   12/09/22 95    08/15/22 92   01/25/22 69     Body mass index is 45.2 kg/m².    Physical Exam  Constitutional:       Appearance: He is well-developed. He is obese.   HENT:      Head: Normocephalic.   Neck:      Thyroid: No thyromegaly.      Vascular: No carotid bruit.   Cardiovascular:      Rate and Rhythm: Normal rate and regular rhythm.      Heart sounds: Normal heart sounds.   Pulmonary:      Effort: Pulmonary effort is normal.      Breath sounds: Normal breath sounds.   Musculoskeletal:      Cervical back: Neck supple.   Skin:     General: Skin is warm and dry.   Neurological:      Mental Status: He is alert and oriented to person, place, and time.   Psychiatric:         Behavior: Behavior normal.         Assessment & Plan   Diagnoses and all orders for this visit:    1. Essential hypertension (Primary)    2. Adjustment insomnia    3. Vitamin D deficiency    Other orders  -     olmesartan (Benicar) 5 MG tablet; Take 2 tablets by mouth Daily.  Dispense: 180 tablet; Refill: 1  -     amLODIPine (NORVASC) 10 MG tablet; Take 1 tablet by mouth Daily.  Dispense: 90 tablet; Refill: 1        1.  Hypertension-we will continue amlodipine 10 mg a day and Benicar 10 mg a day.  He will take 2 tablets of 5 mg of Benicar a day.  It is easier for him then breaking 20 mg in half.  He is advised to exercise at least 30 minutes a day, 5 days a week.  Continue to work on decreasing portion size.  Check labs today.  Follow-up in 6 months.    2. Insomnia-continue current treatment.  Follow-up in 6 months.    3.  Vitamin D deficiency-continue current treatment.  Check labs today.  Follow-up in 6 to 12 months.

## 2023-03-06 ENCOUNTER — OFFICE VISIT (OUTPATIENT)
Dept: INTERNAL MEDICINE | Facility: CLINIC | Age: 43
End: 2023-03-06
Payer: COMMERCIAL

## 2023-03-06 VITALS
BODY MASS INDEX: 43.81 KG/M2 | TEMPERATURE: 97.5 F | HEART RATE: 66 BPM | HEIGHT: 70 IN | OXYGEN SATURATION: 98 % | WEIGHT: 306 LBS | DIASTOLIC BLOOD PRESSURE: 85 MMHG | SYSTOLIC BLOOD PRESSURE: 130 MMHG

## 2023-03-06 DIAGNOSIS — N48.9 LESION OF PENIS: ICD-10-CM

## 2023-03-06 DIAGNOSIS — F41.8 PERFORMANCE ANXIETY: ICD-10-CM

## 2023-03-06 DIAGNOSIS — Z00.00 PHYSICAL EXAM, ANNUAL: Primary | ICD-10-CM

## 2023-03-06 PROCEDURE — 99396 PREV VISIT EST AGE 40-64: CPT | Performed by: FAMILY MEDICINE

## 2023-03-06 PROCEDURE — 99214 OFFICE O/P EST MOD 30 MIN: CPT | Performed by: FAMILY MEDICINE

## 2023-03-06 RX ORDER — OLMESARTAN MEDOXOMIL 5 MG/1
10 TABLET ORAL DAILY
Qty: 180 TABLET | Refills: 1 | Status: SHIPPED | OUTPATIENT
Start: 2023-03-06 | End: 2023-03-08 | Stop reason: RX

## 2023-03-06 RX ORDER — AMLODIPINE BESYLATE 10 MG/1
10 TABLET ORAL DAILY
Qty: 90 TABLET | Refills: 1 | Status: SHIPPED | OUTPATIENT
Start: 2023-03-06

## 2023-03-06 RX ORDER — PROPRANOLOL HYDROCHLORIDE 20 MG/1
TABLET ORAL
Qty: 30 TABLET | Refills: 1 | Status: SHIPPED | OUTPATIENT
Start: 2023-03-06

## 2023-03-06 NOTE — PROGRESS NOTES
Subjective   Farzad Collins is a 43 y.o. male.   Chief Complaint   Patient presents with   • Annual Exam   • Anxiety   • Hypertension       History of Present Illness     Patient is here for complete physical exam.    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    There is no change in medical or surgical history.  No hospitalizations.  His father is suspected of having lung cancer.  He awaits biopsy.  No change in prescription medications, over-the-counter he takes Zyrtec as needed and vitamin D3 at 2000 units a day.  He is not allergic to any medications.  He does not use tobacco products, he drinks 1-2 drinks a year, no drugs.  He describes his eating habits as overall healthy.  He likes fish and chicken and vegetables.  He eats fast food once or twice a week.  He started to exercise at the end of January.  He goes to the gym twice a week he walks for about an hour there.  Dental appointments every 6 months.  Last eye exam in summer 2022.  He wears glasses.  He has a history of rectal bleeding from hemorrhoids.  It is unchanged.  No blood seen in stool.  He had Colonoscopy in 2020- hemorrhoids banding x3.  1 polyp was removed.  Tubular adenoma.  Next colonoscopy was recommended 5 years later.    He had COVID-vaccine x3, flu vaccine this season, tetanus vaccine 2019.    Performance anxiety-he gets very nervous when he gives speeches.  His heart is racing.  It affects his life.  Discoloration on the tip of penis- no new sexual partners.  No burning sensation, no itching.  It is asymptomatic.  He noticed it 2 months ago.    The following portions of the patient's history were reviewed and updated as appropriate: allergies, current medications, past family history, past medical history, past social history, past surgical history and problem list.    Review of Systems   Constitutional: Negative for chills and fever.   Respiratory: Negative for shortness of breath.    Cardiovascular:  Negative for chest pain and palpitations.   Gastrointestinal: Negative for blood in stool.   Genitourinary: Negative for dysuria and hematuria.   Neurological: Negative for light-headedness.   Psychiatric/Behavioral: Negative.  Negative for suicidal ideas.         Objective   Wt Readings from Last 3 Encounters:   03/06/23 (!) 139 kg (306 lb)   12/09/22 (!) 143 kg (315 lb)   08/15/22 (!) 143 kg (315 lb)      Vitals:    03/06/23 1330   BP: 130/85   Pulse: 66   Temp: 97.5 °F (36.4 °C)   SpO2: 98%     Temp Readings from Last 3 Encounters:   03/06/23 97.5 °F (36.4 °C)   12/09/22 97.3 °F (36.3 °C)   08/15/22 97.8 °F (36.6 °C)     BP Readings from Last 3 Encounters:   03/06/23 130/85   12/09/22 132/80   08/15/22 144/80     Pulse Readings from Last 3 Encounters:   03/06/23 66   12/09/22 95   08/15/22 92     Body mass index is 43.91 kg/m².    Physical Exam  Vitals reviewed.   Constitutional:       General: He is not in acute distress.     Appearance: He is well-developed. He is not diaphoretic.   HENT:      Head: Normocephalic and atraumatic. Hair is normal.      Right Ear: Hearing, tympanic membrane, ear canal and external ear normal.      Left Ear: Hearing, tympanic membrane, ear canal and external ear normal.      Nose: No nasal deformity.      Mouth/Throat:      Mouth: No oral lesions.      Pharynx: Uvula midline. No uvula swelling.   Eyes:      General: Lids are normal. No scleral icterus.        Right eye: No discharge.         Left eye: No discharge.      Extraocular Movements:      Right eye: Normal extraocular motion and no nystagmus.      Left eye: Normal extraocular motion and no nystagmus.      Conjunctiva/sclera: Conjunctivae normal.      Pupils: Pupils are equal, round, and reactive to light.   Neck:      Thyroid: No thyromegaly.      Vascular: No JVD.   Cardiovascular:      Rate and Rhythm: Normal rate and regular rhythm.      Pulses: Normal pulses.      Heart sounds: Normal heart sounds. No murmur heard.     No gallop.   Pulmonary:      Effort: Pulmonary effort is normal. No respiratory distress.      Breath sounds: Normal breath sounds. No wheezing or rales.   Chest:      Chest wall: No tenderness.   Abdominal:      General: There is no distension.      Palpations: Abdomen is soft. There is no mass.      Tenderness: There is no abdominal tenderness. There is no guarding.      Hernia: No hernia is present.   Genitourinary:     Penis: Lesions present. No erythema or discharge.       Testes:         Right: Mass or tenderness not present.         Left: Mass or tenderness not present.   Musculoskeletal:         General: No tenderness or deformity. Normal range of motion.      Cervical back: Normal range of motion and neck supple.   Lymphadenopathy:      Cervical: No cervical adenopathy.      Lower Body: No right inguinal adenopathy. No left inguinal adenopathy.   Skin:     General: Skin is warm and dry.      Findings: No rash.   Neurological:      Mental Status: He is alert and oriented to person, place, and time.      Cranial Nerves: No cranial nerve deficit.      Motor: No abnormal muscle tone.      Coordination: Coordination normal.      Deep Tendon Reflexes: Reflexes are normal and symmetric. Reflexes normal.   Psychiatric:         Behavior: Behavior normal.         Thought Content: Thought content normal.         Judgment: Judgment normal.         Assessment & Plan   Diagnoses and all orders for this visit:    1. Physical exam, annual (Primary)    2. Performance anxiety    3. Lesion of penis  -     Ambulatory Referral to Urology    Other orders  -     olmesartan (Benicar) 5 MG tablet; Take 2 tablets by mouth Daily.  Dispense: 180 tablet; Refill: 1  -     amLODIPine (NORVASC) 10 MG tablet; Take 1 tablet by mouth Daily.  Dispense: 90 tablet; Refill: 1  -     propranolol (INDERAL) 20 MG tablet; Take 1 tablet as needed, 30 to 60 minutes prior to giving speech.  Dispense: 30 tablet; Refill: 1        1. CPE-preventive  counseling provided.  Obesity increases risk for developing diabetes and heart disease.  He did good job with improving his diet and starting exercise.  Exercise at least 30 minutes a day, 5 days a week.  Continue to work on weight loss.  Limit fast food.  Vaccinations including COVID-vaccine, flu vaccine, tetanus vaccine discussed.  He will be due for colon cancer screening in 2025. Sun protection discussed.  Continue regular dental appointments at least every 6 months.    2. Penis lesion-referral to urologist.  3.  Performance anxiety-new problem.  We will treat with propranolol 20 mg as needed 30 to 60 minutes prior to presentation.  Follow-up in 6 months.  4.  Hypertension-continue current treatment.  Follow-up in 6 months.

## 2023-03-08 RX ORDER — OLMESARTAN MEDOXOMIL 20 MG/1
TABLET ORAL
Qty: 45 TABLET | Refills: 1 | Status: SHIPPED | OUTPATIENT
Start: 2023-03-08

## 2023-04-03 RX ORDER — PROPRANOLOL HYDROCHLORIDE 20 MG/1
TABLET ORAL
Qty: 30 TABLET | Refills: 1 | OUTPATIENT
Start: 2023-04-03

## 2023-09-12 RX ORDER — OLMESARTAN MEDOXOMIL 20 MG/1
TABLET ORAL
Qty: 15 TABLET | Refills: 0 | Status: SHIPPED | OUTPATIENT
Start: 2023-09-12 | End: 2023-09-15 | Stop reason: SDUPTHER

## 2023-09-13 ENCOUNTER — TELEPHONE (OUTPATIENT)
Dept: INTERNAL MEDICINE | Facility: CLINIC | Age: 43
End: 2023-09-13
Payer: COMMERCIAL

## 2023-09-15 ENCOUNTER — OFFICE VISIT (OUTPATIENT)
Dept: INTERNAL MEDICINE | Facility: CLINIC | Age: 43
End: 2023-09-15
Payer: COMMERCIAL

## 2023-09-15 VITALS
HEART RATE: 81 BPM | DIASTOLIC BLOOD PRESSURE: 80 MMHG | TEMPERATURE: 98.1 F | BODY MASS INDEX: 43.38 KG/M2 | OXYGEN SATURATION: 98 % | SYSTOLIC BLOOD PRESSURE: 136 MMHG | HEIGHT: 70 IN | WEIGHT: 303 LBS

## 2023-09-15 DIAGNOSIS — I10 ESSENTIAL HYPERTENSION: Primary | ICD-10-CM

## 2023-09-15 DIAGNOSIS — E55.9 VITAMIN D DEFICIENCY: ICD-10-CM

## 2023-09-15 DIAGNOSIS — F51.02 ADJUSTMENT INSOMNIA: ICD-10-CM

## 2023-09-15 DIAGNOSIS — F41.8 PERFORMANCE ANXIETY: ICD-10-CM

## 2023-09-15 PROCEDURE — 99214 OFFICE O/P EST MOD 30 MIN: CPT | Performed by: FAMILY MEDICINE

## 2023-09-15 RX ORDER — OLMESARTAN MEDOXOMIL 20 MG/1
TABLET ORAL
Qty: 45 TABLET | Refills: 1 | Status: SHIPPED | OUTPATIENT
Start: 2023-09-15

## 2023-09-15 RX ORDER — AMLODIPINE BESYLATE 10 MG/1
10 TABLET ORAL DAILY
Qty: 90 TABLET | Refills: 1 | Status: SHIPPED | OUTPATIENT
Start: 2023-09-15

## 2023-09-15 NOTE — PROGRESS NOTES
Subjective   Farzad Collins is a 43 y.o. male.   Chief Complaint   Patient presents with    Hypertension    Vitamin D Deficiency    Insomnia      1. Hypertension- on amlodipine at 10 mg a day and Benicar 10 mg a day (he takes half tablet of 20 mg). He takes it daily.  He has no chest pain, no shortness of breath, no lightheadedness, no irregular heart rate.  He did not exercise regularly lately.  He was taking care of his wife who broke her leg.   No labs done prior to office visit.  He had cough on lisinopril.     2.  Insomnia-patient is a .  He is under a lot of stress.  Sometimes he has problems with sleeping.  He tried melatonin, but it did not work.  He is on trazodone 50 mg taken as needed.  On average he takes it twice a month.  He has no side effects.  No drowsiness.  He gets 8 to 9 hours of sleep.       3.  Vitamin D deficiency-he takes vitamin D3 2000 units a day.  No labs done prior to office visit.    4.  Performance anxiety-at last office visit we prescribed propranolol 20 mg to be taken prior to presentations.  It works great.  He has no side effects.        Review of Systems   Respiratory:  Negative for shortness of breath.    Cardiovascular:  Negative for chest pain and palpitations.   Musculoskeletal:  Negative for neck pain.   Neurological:  Negative for light-headedness and headaches.       Objective   Wt Readings from Last 3 Encounters:   09/15/23 (!) 137 kg (303 lb)   03/06/23 (!) 139 kg (306 lb)   12/09/22 (!) 143 kg (315 lb)      Vitals:    09/15/23 0921   BP: 136/80   Pulse: 81   Temp: 98.1 °F (36.7 °C)   SpO2: 98%     Temp Readings from Last 3 Encounters:   09/15/23 98.1 °F (36.7 °C)   03/06/23 97.5 °F (36.4 °C)   12/09/22 97.3 °F (36.3 °C)     BP Readings from Last 3 Encounters:   09/15/23 136/80   03/06/23 130/85   12/09/22 132/80     Pulse Readings from Last 3 Encounters:   09/15/23 81   03/06/23 66   12/09/22 95     Body mass index is 43.48 kg/m².    Physical  Exam  Constitutional:       Appearance: He is well-developed.   Neck:      Thyroid: No thyromegaly.      Vascular: No carotid bruit.   Cardiovascular:      Rate and Rhythm: Normal rate and regular rhythm.      Heart sounds: Normal heart sounds.   Pulmonary:      Effort: Pulmonary effort is normal.      Breath sounds: Normal breath sounds.   Skin:     General: Skin is warm and dry.   Neurological:      Mental Status: He is alert.   Psychiatric:         Behavior: Behavior normal.       Assessment & Plan   Diagnoses and all orders for this visit:    1. Essential hypertension (Primary)  -     Basic Metabolic Panel    2. Adjustment insomnia    3. Vitamin D deficiency  -     Vitamin D,25-Hydroxy    4. Performance anxiety    Other orders  -     olmesartan (BENICAR) 20 MG tablet; TAKE 1/2 TABLET BY MOUTH ONCE DAILY  Dispense: 45 tablet; Refill: 1  -     amLODIPine (NORVASC) 10 MG tablet; Take 1 tablet by mouth Daily.  Dispense: 90 tablet; Refill: 1        1.  Hypertension-continue current treatment.  Check labs today.  Weight loss would be beneficial.  He is planning to start exercise next week.  Follow-up in 6 months.  2. Performance anxiety-continue current treatment.  Follow-up in 6 to 12 months.  3.  Vitamin D deficiency-check labs.  Continue current treatment.  Follow-up in 6 months.  4.  Insomnia-continue current treatment.  Follow-up in 6 to 12 months

## 2023-09-16 LAB
25(OH)D3+25(OH)D2 SERPL-MCNC: 28 NG/ML (ref 30–100)
BUN SERPL-MCNC: 10 MG/DL (ref 6–20)
BUN/CREAT SERPL: 12.2 (ref 7–25)
CALCIUM SERPL-MCNC: 9.1 MG/DL (ref 8.6–10.5)
CHLORIDE SERPL-SCNC: 106 MMOL/L (ref 98–107)
CO2 SERPL-SCNC: 23.3 MMOL/L (ref 22–29)
CREAT SERPL-MCNC: 0.82 MG/DL (ref 0.76–1.27)
EGFRCR SERPLBLD CKD-EPI 2021: 111.8 ML/MIN/1.73
GLUCOSE SERPL-MCNC: 95 MG/DL (ref 65–99)
POTASSIUM SERPL-SCNC: 4.1 MMOL/L (ref 3.5–5.2)
SODIUM SERPL-SCNC: 140 MMOL/L (ref 136–145)

## 2023-10-13 RX ORDER — OLMESARTAN MEDOXOMIL 20 MG/1
TABLET ORAL
Qty: 90 TABLET | Refills: 0 | Status: SHIPPED | OUTPATIENT
Start: 2023-10-13

## 2024-03-19 DIAGNOSIS — E55.9 VITAMIN D DEFICIENCY: Primary | ICD-10-CM

## 2024-03-19 DIAGNOSIS — Z00.00 PHYSICAL EXAM, ANNUAL: ICD-10-CM

## 2024-03-22 LAB
25(OH)D3+25(OH)D2 SERPL-MCNC: 28 NG/ML (ref 30–100)
ALBUMIN SERPL-MCNC: 4.1 G/DL (ref 3.5–5.2)
ALBUMIN/GLOB SERPL: 1.6 G/DL
ALP SERPL-CCNC: 79 U/L (ref 39–117)
ALT SERPL-CCNC: 29 U/L (ref 1–41)
AST SERPL-CCNC: 15 U/L (ref 1–40)
BILIRUB SERPL-MCNC: 0.4 MG/DL (ref 0–1.2)
BUN SERPL-MCNC: 10 MG/DL (ref 6–20)
BUN/CREAT SERPL: 10.3 (ref 7–25)
CALCIUM SERPL-MCNC: 9.3 MG/DL (ref 8.6–10.5)
CHLORIDE SERPL-SCNC: 104 MMOL/L (ref 98–107)
CHOLEST SERPL-MCNC: 200 MG/DL (ref 0–200)
CO2 SERPL-SCNC: 22.8 MMOL/L (ref 22–29)
CREAT SERPL-MCNC: 0.97 MG/DL (ref 0.76–1.27)
EGFRCR SERPLBLD CKD-EPI 2021: 98.7 ML/MIN/1.73
ERYTHROCYTE [DISTWIDTH] IN BLOOD BY AUTOMATED COUNT: 14.5 % (ref 12.3–15.4)
GLOBULIN SER CALC-MCNC: 2.5 GM/DL
GLUCOSE SERPL-MCNC: 85 MG/DL (ref 65–99)
HCT VFR BLD AUTO: 43.7 % (ref 37.5–51)
HDLC SERPL-MCNC: 35 MG/DL (ref 40–60)
HGB BLD-MCNC: 14.4 G/DL (ref 13–17.7)
LDLC SERPL CALC-MCNC: 147 MG/DL (ref 0–100)
LDLC/HDLC SERPL: 4.15 {RATIO}
MCH RBC QN AUTO: 27.9 PG (ref 26.6–33)
MCHC RBC AUTO-ENTMCNC: 33 G/DL (ref 31.5–35.7)
MCV RBC AUTO: 84.7 FL (ref 79–97)
PLATELET # BLD AUTO: 288 10*3/MM3 (ref 140–450)
POTASSIUM SERPL-SCNC: 4.3 MMOL/L (ref 3.5–5.2)
PROT SERPL-MCNC: 6.6 G/DL (ref 6–8.5)
RBC # BLD AUTO: 5.16 10*6/MM3 (ref 4.14–5.8)
SODIUM SERPL-SCNC: 138 MMOL/L (ref 136–145)
TRIGL SERPL-MCNC: 98 MG/DL (ref 0–150)
TSH SERPL DL<=0.005 MIU/L-ACNC: 2.27 UIU/ML (ref 0.45–4.5)
VLDLC SERPL CALC-MCNC: 18 MG/DL (ref 5–40)
WBC # BLD AUTO: 6.72 10*3/MM3 (ref 3.4–10.8)

## 2024-03-26 ENCOUNTER — OFFICE VISIT (OUTPATIENT)
Dept: INTERNAL MEDICINE | Facility: CLINIC | Age: 44
End: 2024-03-26
Payer: COMMERCIAL

## 2024-03-26 VITALS
OXYGEN SATURATION: 96 % | BODY MASS INDEX: 40.52 KG/M2 | WEIGHT: 283 LBS | HEIGHT: 70 IN | DIASTOLIC BLOOD PRESSURE: 70 MMHG | TEMPERATURE: 97.4 F | HEART RATE: 76 BPM | SYSTOLIC BLOOD PRESSURE: 122 MMHG

## 2024-03-26 DIAGNOSIS — Z00.00 PHYSICAL EXAM, ANNUAL: Primary | ICD-10-CM

## 2024-03-26 DIAGNOSIS — K62.5 RECTAL BLEEDING: ICD-10-CM

## 2024-03-26 DIAGNOSIS — E55.9 VITAMIN D DEFICIENCY: ICD-10-CM

## 2024-03-26 DIAGNOSIS — F41.8 PERFORMANCE ANXIETY: ICD-10-CM

## 2024-03-26 DIAGNOSIS — F51.02 ADJUSTMENT INSOMNIA: ICD-10-CM

## 2024-03-26 DIAGNOSIS — I10 ESSENTIAL HYPERTENSION: ICD-10-CM

## 2024-03-26 RX ORDER — TRAZODONE HYDROCHLORIDE 50 MG/1
50 TABLET ORAL NIGHTLY PRN
Qty: 30 TABLET | Refills: 0 | Status: SHIPPED | OUTPATIENT
Start: 2024-03-26

## 2024-03-26 RX ORDER — AMLODIPINE BESYLATE 10 MG/1
10 TABLET ORAL DAILY
Qty: 90 TABLET | Refills: 1 | Status: SHIPPED | OUTPATIENT
Start: 2024-03-26

## 2024-03-26 RX ORDER — PROPRANOLOL HYDROCHLORIDE 20 MG/1
TABLET ORAL
Qty: 30 TABLET | Refills: 1 | Status: SHIPPED | OUTPATIENT
Start: 2024-03-26

## 2024-03-26 RX ORDER — OLMESARTAN MEDOXOMIL 20 MG/1
TABLET ORAL
Qty: 90 TABLET | Refills: 0 | Status: SHIPPED | OUTPATIENT
Start: 2024-03-26

## 2024-03-26 NOTE — PROGRESS NOTES
Subjective   Farzad Collins is a 44 y.o. male.   Chief Complaint   Patient presents with    Annual Exam    Hypertension    Vitamin D Deficiency    Anxiety    Insomnia       History of Present Illness     He is here for complete physical exam and to follow-up on hypertension, anxiety, insomnia and vitamin D deficiency.  He c/o of rectal bleeding with every bowel movement for months/years.    CPE    In January he started making changes in his diet.  He eats no sugar.  No pasta and no bread.  He walks once or twice a week for 30 to 40 minutes.  He lost 30 pounds on his home scale since he started doing it.    He reports no ER visits or hospitalizations within the last year.  No change in medical or surgical history from last office visit.  No change in family history.  No change in prescription medications.  Over-the-counter he takes Zyrtec as needed, fiber, multivitamin, calcium with vitamin D3.  He is not allergic to medications.  He does not use tobacco products, he drinks 1-2 drinks a year, no drugs.  Dental appointments every 6 months.  Last eye exam was less than a year ago.  He had flu vaccine this season.  Tetanus vaccine 2019.    He was evaluated by urologist April 2023 for atrophic skin changes.  He was prescribed cream (NOS), but reports that it did not help.    Rectal bleeding-he has a history of hemorrhoids.  He has hemorrhoids banding done in 2020.  He reports that he continues to have rectal bleeding.  It happens with every bowel movement, no matter if stool is soft or hard.  He reports that there is some blood in toilet, on paper and stool is covered with blood, but not mixed.  There is no pain with bowel movements.  Hemoglobin normal at 14.4.    Hypertension - on amlodipine at 10 mg a day and Benicar 10 mg a day (he takes half tablet of 20 mg). He takes it daily.  He has no chest pain, no shortness of breath, no lightheadedness, no irregular heart rate.  Creactine at 0.97, GFR 98.7.  He had cough on  lisinopril.     Insomnia-patient is a .  He is under a lot of stress.  Sometimes he has problems with sleeping.  He tried melatonin, but it did not work.  He is on trazodone 50 mg taken as needed.  He takes it a few times a year he has no side effects.  No drowsiness.  He gets ~7 hours of sleep.       Vitamin D deficiency-he takes vitamin D3 1300 units a day.  Vitamin D is low at 28.0.     Performance anxiety-on propranolol 20 mg to be taken prior to presentations.  It works great.  He has no side effects.        Review of Systems   Constitutional:  Negative for chills and fever.   Respiratory:  Negative for shortness of breath.    Cardiovascular:  Negative for chest pain and palpitations.   Gastrointestinal:  Positive for blood in stool.   Genitourinary:  Negative for hematuria.   Neurological:  Negative for light-headedness.   Psychiatric/Behavioral: Negative.  Negative for suicidal ideas.          Objective   Wt Readings from Last 3 Encounters:   03/26/24 128 kg (283 lb)   09/15/23 (!) 137 kg (303 lb)   03/06/23 (!) 139 kg (306 lb)      Vitals:    03/26/24 0855   BP: 122/70   Pulse: 76   Temp: 97.4 °F (36.3 °C)   SpO2: 96%     Temp Readings from Last 3 Encounters:   03/26/24 97.4 °F (36.3 °C)   09/15/23 98.1 °F (36.7 °C)   03/06/23 97.5 °F (36.4 °C)     BP Readings from Last 3 Encounters:   03/26/24 122/70   09/15/23 136/80   03/06/23 130/85     Pulse Readings from Last 3 Encounters:   03/26/24 76   09/15/23 81   03/06/23 66     Body mass index is 40.61 kg/m².    Physical Exam  Vitals reviewed.   Constitutional:       General: He is not in acute distress.     Appearance: He is well-developed. He is not diaphoretic.   HENT:      Head: Normocephalic and atraumatic. Hair is normal.      Right Ear: Hearing, tympanic membrane, ear canal and external ear normal.      Left Ear: Hearing, tympanic membrane, ear canal and external ear normal.      Nose: No nasal deformity.      Mouth/Throat:      Mouth: No oral  lesions.      Pharynx: Uvula midline. No uvula swelling.   Eyes:      General: Lids are normal. No scleral icterus.        Right eye: No discharge.         Left eye: No discharge.      Extraocular Movements:      Right eye: Normal extraocular motion and no nystagmus.      Left eye: Normal extraocular motion and no nystagmus.      Conjunctiva/sclera: Conjunctivae normal.      Pupils: Pupils are equal, round, and reactive to light.   Neck:      Thyroid: No thyromegaly.      Vascular: No JVD.   Cardiovascular:      Rate and Rhythm: Normal rate and regular rhythm.      Pulses: Normal pulses.      Heart sounds: Normal heart sounds. No murmur heard.     No gallop.   Pulmonary:      Effort: Pulmonary effort is normal. No respiratory distress.      Breath sounds: Normal breath sounds. No wheezing or rales.   Chest:      Chest wall: No tenderness.   Abdominal:      General: There is no distension.      Palpations: Abdomen is soft. There is no mass.      Tenderness: There is no abdominal tenderness. There is no guarding.      Hernia: No hernia is present.   Genitourinary:     Penis: Lesions present.       Testes: Normal.         Right: Mass or tenderness not present.         Left: Mass or tenderness not present.      Rectum: Guaiac result negative. External hemorrhoid present. No mass or tenderness.      Comments: Small external hemorrhoid.    Penile lesions-unchanged comparing to a year ago.  Musculoskeletal:         General: No tenderness or deformity. Normal range of motion.      Cervical back: Normal range of motion.   Lymphadenopathy:      Cervical: No cervical adenopathy.      Upper Body:      Right upper body: No supraclavicular adenopathy.      Left upper body: No supraclavicular adenopathy.   Skin:     General: Skin is warm and dry.      Findings: No rash.   Neurological:      Mental Status: He is alert and oriented to person, place, and time.      Cranial Nerves: No cranial nerve deficit.      Motor: No abnormal  muscle tone.      Coordination: Coordination normal.      Deep Tendon Reflexes: Reflexes are normal and symmetric. Reflexes normal.   Psychiatric:         Behavior: Behavior normal.         Thought Content: Thought content normal.         Judgment: Judgment normal.       Assessment & Plan   Diagnoses and all orders for this visit:    1. Physical exam, annual (Primary)    2. Essential hypertension    3. Adjustment insomnia    4. Performance anxiety    5. Vitamin D deficiency    6. Rectal bleeding  -     Ambulatory Referral to General Surgery    Other orders  -     olmesartan (BENICAR) 20 MG tablet; TAKE 1/2 TABLET BY MOUTH EVERY DAY  Dispense: 90 tablet; Refill: 0  -     amLODIPine (NORVASC) 10 MG tablet; Take 1 tablet by mouth Daily.  Dispense: 90 tablet; Refill: 1  -     traZODone (DESYREL) 50 MG tablet; Take 1 tablet by mouth At Night As Needed for Sleep.  Dispense: 30 tablet; Refill: 0  -     propranolol (INDERAL) 20 MG tablet; Take 1 tablet as needed, 30 to 60 minutes prior to giving speech.  Dispense: 30 tablet; Refill: 1        CPE-preventive counseling provided.  Good job with weight loss.  Continue to work on it.  Increase exercise to at least 30 to 60 minutes a day, 5 days a week.  Continue regular dental appointments at least every 6 months.  We discussed recommendations for vaccinations including COVID-vaccine.  Sun protection recommended.  He is advised to schedule follow-up with his urologist.  Skin lesions are unchanged with the use of cream that was prescribed.    Rectal bleeding-referral to colorectal surgeon.  Information on constipation prevention added to discharge summary.  Hypertension-continue current treatment.  Follow-up in 6 months.  Insomnia - continue current treatment.  Follow-up in 6 to 12 months.  Performance anxiety-continue current treatment.  Follow-up in 6 to 12 months.  Vitamin D deficiency-uncontrolled.  Take extra 2000 units of vitamin D3 a day.  Labs in 6 months before office  visit.          Class 3 Severe Obesity (BMI >=40). Obesity-related health conditions include the following: hypertension. Obesity is improving with lifestyle modifications. BMI is is above average; BMI management plan is completed. We discussed portion control and increasing exercise.

## 2024-04-15 ENCOUNTER — OFFICE VISIT (OUTPATIENT)
Dept: SURGERY | Facility: CLINIC | Age: 44
End: 2024-04-15
Payer: COMMERCIAL

## 2024-04-15 VITALS
HEIGHT: 70 IN | WEIGHT: 278 LBS | DIASTOLIC BLOOD PRESSURE: 72 MMHG | BODY MASS INDEX: 39.8 KG/M2 | SYSTOLIC BLOOD PRESSURE: 125 MMHG

## 2024-04-15 DIAGNOSIS — K64.4 INTERNAL AND EXTERNAL BLEEDING HEMORRHOIDS: Primary | ICD-10-CM

## 2024-04-15 DIAGNOSIS — Z12.11 COLON CANCER SCREENING: ICD-10-CM

## 2024-04-15 DIAGNOSIS — K64.8 INTERNAL AND EXTERNAL BLEEDING HEMORRHOIDS: Primary | ICD-10-CM

## 2024-04-15 PROCEDURE — 99204 OFFICE O/P NEW MOD 45 MIN: CPT | Performed by: SURGERY

## 2024-04-15 PROCEDURE — 46600 DIAGNOSTIC ANOSCOPY SPX: CPT | Performed by: SURGERY

## 2024-04-15 NOTE — PROGRESS NOTES
Colorectal & General Surgery  Consultation    Patient: Farzad Collins  YOB: 1980  MRN: [id number]      Assessment  Farzad Collins is a 44 y.o. male with bleeding grade 3 internal hemorrhoids with associated external hemorrhoids.  He has undergone hemorrhoid banding during colonoscopy in 2020 with only transient relief from his hematochezia.  Given that his hematochezia has been stable and constant since that time, low suspicion for any mucosal lesions within the colon or rectum that could be contributing to the hematochezia.  He is due for colorectal cancer screening again in 2025.    Regarding his bleeding grade 3 internal and external hemorrhoids, I have recommended that he undergo excisional hemorrhoidectomy.  We discussed the risk, benefits, alternatives to this procedure.  Informed consent was obtained.  He would like to get through his son sizable graduation and plan to have this procedure performed in August or September.    Colonoscopy due: 2025    Referring Provider: Jocelyne Marinelli MD     Reason for Consultation: Hematochezia    History of Present Illness   Farzad Collins is a 44 y.o. male with history of internal hemorrhoids status post hemorrhoid banding x 2 presents to the office with persistent hematochezia.  He says that he has had hematochezia for more than 10 years.  The last time he had hemorrhoid banding in 2020, he noticed transient improvement in his hematochezia for about 6 to 8 months but then subsequently returned.  He describes hematochezia with every bowel movement.  He denies any significant constipation, tenesmus, change in his bowel habits, unintentional weight loss.    Most recent colonoscopy: 2020, small sigmoid polyp, 5-year repeat    Past Medical History   Past Medical History:   Diagnosis Date    Colon polyp 2011    Hypertension     Rectal bleeding 2001        Past Surgical History   Past Surgical History:   Procedure Laterality Date    COLONOSCOPY  2020    NO  PAST SURGERIES         Social History  Social History     Socioeconomic History    Marital status:    Tobacco Use    Smoking status: Never    Smokeless tobacco: Never   Vaping Use    Vaping status: Never Used   Substance and Sexual Activity    Alcohol use: Not Currently    Drug use: Never    Sexual activity: Yes     Partners: Male     Birth control/protection: None       Family History  Family History   Problem Relation Age of Onset    Kidney disease Mother     Alcohol abuse Mother     Kidney disease Father     Lung cancer Father     Depression Father     Alcohol abuse Father     Glaucoma Father        Colorectal cancer family history: None    Review of Systems  Negative except as documented in the HPI.     Allergies  No Known Allergies    Medications    Current Outpatient Medications:     amLODIPine (NORVASC) 10 MG tablet, Take 1 tablet by mouth Daily., Disp: 90 tablet, Rfl: 1    olmesartan (BENICAR) 20 MG tablet, TAKE 1/2 TABLET BY MOUTH EVERY DAY, Disp: 90 tablet, Rfl: 0    propranolol (INDERAL) 20 MG tablet, Take 1 tablet as needed, 30 to 60 minutes prior to giving speech., Disp: 30 tablet, Rfl: 1    traZODone (DESYREL) 50 MG tablet, Take 1 tablet by mouth At Night As Needed for Sleep., Disp: 30 tablet, Rfl: 0    Vital Signs  Vitals:    04/15/24 0824   BP: 125/72        Physical Exam  Constitutional: Resting comfortably, no acute distress  Neck: Supple, trachea midline  Respiratory: No increased work of breathing, Symmetric excursion  Cardiovascular: Well pefursed, no jugular venous distention evident   Abdominal:  Soft, non-tender, non-distended  Lymphatics: No cervical or suprascapular adenopathy  Skin: Warm, dry, no rash on visualized skin surfaces  Musculoskeletal: Symmetric strength, no obvious gross abnormalities  Psychiatric: Alert and oriented ×3, normal affect   Perianal: Left posterior external hemorrhoid, irritated but nonbleeding.  Digital rectal exam: Normal tone, no masses    DIAGNOSTIC  ANOSCOPY  Indication: Hematochezia.  Risks, benefits and alternatives were discussed and the patient agreed to the procedure.      Procedure Note: With a lubricated, gloved index finger, I gently performed a 360 degree sweep of the rectum checking for anal sphincter tone, tenderness, nodules, and masses. Following gentle dilation with a digital rectal exam, I inserted the lubricated anoscope with the obturator completely inserted. The obturator was removed after fully inserting the anoscope. The anoscope was slowly withdrawn, and the rectal and anal mucosa examined over 360 degrees.     Findings: Multiple columns of internal hemorrhoids, all with some irritation.  Largest column in the left posterior position.  No other abnormalities of the anoderm or anal canal.  Complications: None  Patient tolerated the procedure well.      Laboratory Results  I have personally reviewed CBC with WBC 6, hemoglobin 14, platelets 288.  CMP with creatinine 0.97, albumin 4.1, bicarb 22.    Radiology  None review    Endoscopy  I have personally reviewed outside colonoscopy report from July 2020 performed at [facility] by Dr. Reagan Cox which demonstrated a small sigmoid polyp and internal hemorrhoids that were banded.         Fausto Marin MD  Colorectal & General Surgery  [facility]    [address], [address]  [place], [place], [address]  P: [phone]  F: [phone]

## 2024-09-13 DIAGNOSIS — E55.9 VITAMIN D DEFICIENCY: ICD-10-CM

## 2024-09-13 DIAGNOSIS — E66.01 MORBID (SEVERE) OBESITY DUE TO EXCESS CALORIES: ICD-10-CM

## 2024-09-13 DIAGNOSIS — I10 ESSENTIAL HYPERTENSION: Primary | ICD-10-CM

## 2024-09-20 LAB
25(OH)D3+25(OH)D2 SERPL-MCNC: 42.8 NG/ML (ref 30–100)
ALBUMIN SERPL-MCNC: 3.9 G/DL (ref 3.5–5.2)
ALBUMIN/GLOB SERPL: 1.4 G/DL
ALP SERPL-CCNC: 74 U/L (ref 39–117)
ALT SERPL-CCNC: 14 U/L (ref 1–41)
AST SERPL-CCNC: 12 U/L (ref 1–40)
BILIRUB SERPL-MCNC: 0.5 MG/DL (ref 0–1.2)
BUN SERPL-MCNC: 9 MG/DL (ref 6–20)
BUN/CREAT SERPL: 10 (ref 7–25)
CALCIUM SERPL-MCNC: 9.4 MG/DL (ref 8.6–10.5)
CHLORIDE SERPL-SCNC: 102 MMOL/L (ref 98–107)
CHOLEST SERPL-MCNC: 191 MG/DL (ref 0–200)
CO2 SERPL-SCNC: 25.5 MMOL/L (ref 22–29)
CREAT SERPL-MCNC: 0.9 MG/DL (ref 0.76–1.27)
EGFRCR SERPLBLD CKD-EPI 2021: 108 ML/MIN/1.73
GLOBULIN SER CALC-MCNC: 2.8 GM/DL
GLUCOSE SERPL-MCNC: 84 MG/DL (ref 65–99)
HDLC SERPL-MCNC: 38 MG/DL (ref 40–60)
LDLC SERPL CALC-MCNC: 133 MG/DL (ref 0–100)
LDLC/HDLC SERPL: 3.46 {RATIO}
POTASSIUM SERPL-SCNC: 4.2 MMOL/L (ref 3.5–5.2)
PROT SERPL-MCNC: 6.7 G/DL (ref 6–8.5)
SODIUM SERPL-SCNC: 138 MMOL/L (ref 136–145)
TRIGL SERPL-MCNC: 107 MG/DL (ref 0–150)
VLDLC SERPL CALC-MCNC: 20 MG/DL (ref 5–40)

## 2024-09-24 ENCOUNTER — OFFICE VISIT (OUTPATIENT)
Dept: INTERNAL MEDICINE | Facility: CLINIC | Age: 44
End: 2024-09-24
Payer: COMMERCIAL

## 2024-09-24 VITALS
DIASTOLIC BLOOD PRESSURE: 70 MMHG | HEART RATE: 87 BPM | OXYGEN SATURATION: 97 % | BODY MASS INDEX: 37.65 KG/M2 | WEIGHT: 263 LBS | TEMPERATURE: 97.2 F | HEIGHT: 70 IN | SYSTOLIC BLOOD PRESSURE: 120 MMHG

## 2024-09-24 DIAGNOSIS — F51.02 ADJUSTMENT INSOMNIA: ICD-10-CM

## 2024-09-24 DIAGNOSIS — I10 ESSENTIAL HYPERTENSION: Primary | ICD-10-CM

## 2024-09-24 DIAGNOSIS — E55.9 VITAMIN D DEFICIENCY: ICD-10-CM

## 2024-09-24 PROCEDURE — 99214 OFFICE O/P EST MOD 30 MIN: CPT | Performed by: FAMILY MEDICINE

## 2024-09-24 RX ORDER — AMLODIPINE BESYLATE 10 MG/1
10 TABLET ORAL DAILY
Qty: 90 TABLET | Refills: 1 | Status: SHIPPED | OUTPATIENT
Start: 2024-09-24

## 2024-09-24 RX ORDER — TRAZODONE HYDROCHLORIDE 50 MG/1
50 TABLET, FILM COATED ORAL NIGHTLY PRN
Qty: 30 TABLET | Refills: 1 | Status: SHIPPED | OUTPATIENT
Start: 2024-09-24

## 2024-09-24 RX ORDER — OLMESARTAN MEDOXOMIL 20 MG/1
TABLET ORAL
Qty: 90 TABLET | Refills: 0 | Status: SHIPPED | OUTPATIENT
Start: 2024-09-24

## 2024-12-20 RX ORDER — TRAZODONE HYDROCHLORIDE 50 MG/1
50 TABLET, FILM COATED ORAL NIGHTLY PRN
Qty: 90 TABLET | Refills: 1 | Status: SHIPPED | OUTPATIENT
Start: 2024-12-20

## 2025-03-25 DIAGNOSIS — E55.9 VITAMIN D DEFICIENCY: Primary | ICD-10-CM

## 2025-03-25 DIAGNOSIS — I10 ESSENTIAL HYPERTENSION: ICD-10-CM

## 2025-03-25 DIAGNOSIS — E78.00 ELEVATED LDL CHOLESTEROL LEVEL: ICD-10-CM

## 2025-03-26 LAB
ALBUMIN SERPL-MCNC: 3.8 G/DL (ref 3.5–5.2)
ALBUMIN/GLOB SERPL: 1.3 G/DL
ALP SERPL-CCNC: 67 U/L (ref 39–117)
ALT SERPL-CCNC: 13 U/L (ref 1–41)
AST SERPL-CCNC: 15 U/L (ref 1–40)
BASOPHILS # BLD AUTO: 0.04 10*3/MM3 (ref 0–0.2)
BASOPHILS NFR BLD AUTO: 0.6 % (ref 0–1.5)
BILIRUB SERPL-MCNC: 0.3 MG/DL (ref 0–1.2)
BUN SERPL-MCNC: 12 MG/DL (ref 6–20)
BUN/CREAT SERPL: 14.3 (ref 7–25)
CALCIUM SERPL-MCNC: 9.2 MG/DL (ref 8.6–10.5)
CHLORIDE SERPL-SCNC: 105 MMOL/L (ref 98–107)
CHOLEST SERPL-MCNC: 199 MG/DL (ref 0–200)
CHOLEST/HDLC SERPL: 5.1 {RATIO}
CO2 SERPL-SCNC: 21.2 MMOL/L (ref 22–29)
CREAT SERPL-MCNC: 0.84 MG/DL (ref 0.76–1.27)
EGFRCR SERPLBLD CKD-EPI 2021: 109.6 ML/MIN/1.73
EOSINOPHIL # BLD AUTO: 0.13 10*3/MM3 (ref 0–0.4)
EOSINOPHIL NFR BLD AUTO: 2 % (ref 0.3–6.2)
ERYTHROCYTE [DISTWIDTH] IN BLOOD BY AUTOMATED COUNT: 13.1 % (ref 12.3–15.4)
GLOBULIN SER CALC-MCNC: 2.9 GM/DL
GLUCOSE SERPL-MCNC: 83 MG/DL (ref 65–99)
HCT VFR BLD AUTO: 45.8 % (ref 37.5–51)
HDLC SERPL-MCNC: 39 MG/DL (ref 40–60)
HGB BLD-MCNC: 14.9 G/DL (ref 13–17.7)
IMM GRANULOCYTES # BLD AUTO: 0.02 10*3/MM3 (ref 0–0.05)
IMM GRANULOCYTES NFR BLD AUTO: 0.3 % (ref 0–0.5)
LDLC SERPL CALC-MCNC: 132 MG/DL (ref 0–100)
LYMPHOCYTES # BLD AUTO: 1.98 10*3/MM3 (ref 0.7–3.1)
LYMPHOCYTES NFR BLD AUTO: 29.9 % (ref 19.6–45.3)
MCH RBC QN AUTO: 28.8 PG (ref 26.6–33)
MCHC RBC AUTO-ENTMCNC: 32.5 G/DL (ref 31.5–35.7)
MCV RBC AUTO: 88.6 FL (ref 79–97)
MONOCYTES # BLD AUTO: 0.49 10*3/MM3 (ref 0.1–0.9)
MONOCYTES NFR BLD AUTO: 7.4 % (ref 5–12)
NEUTROPHILS # BLD AUTO: 3.96 10*3/MM3 (ref 1.7–7)
NEUTROPHILS NFR BLD AUTO: 59.8 % (ref 42.7–76)
NRBC BLD AUTO-RTO: 0 /100 WBC (ref 0–0.2)
PLATELET # BLD AUTO: 309 10*3/MM3 (ref 140–450)
POTASSIUM SERPL-SCNC: 4.2 MMOL/L (ref 3.5–5.2)
PROT SERPL-MCNC: 6.7 G/DL (ref 6–8.5)
RBC # BLD AUTO: 5.17 10*6/MM3 (ref 4.14–5.8)
SODIUM SERPL-SCNC: 140 MMOL/L (ref 136–145)
TRIGL SERPL-MCNC: 153 MG/DL (ref 0–150)
TSH SERPL DL<=0.005 MIU/L-ACNC: 2.38 UIU/ML (ref 0.45–4.5)
VLDLC SERPL CALC-MCNC: 28 MG/DL (ref 5–40)
WBC # BLD AUTO: 6.62 10*3/MM3 (ref 3.4–10.8)

## 2025-03-31 ENCOUNTER — OFFICE VISIT (OUTPATIENT)
Dept: INTERNAL MEDICINE | Facility: CLINIC | Age: 45
End: 2025-03-31
Payer: COMMERCIAL

## 2025-03-31 VITALS
TEMPERATURE: 97.8 F | WEIGHT: 263.3 LBS | BODY MASS INDEX: 37.69 KG/M2 | SYSTOLIC BLOOD PRESSURE: 110 MMHG | OXYGEN SATURATION: 97 % | HEART RATE: 70 BPM | DIASTOLIC BLOOD PRESSURE: 74 MMHG | HEIGHT: 70 IN

## 2025-03-31 DIAGNOSIS — E66.09 CLASS 2 OBESITY DUE TO EXCESS CALORIES WITHOUT SERIOUS COMORBIDITY WITH BODY MASS INDEX (BMI) OF 37.0 TO 37.9 IN ADULT: ICD-10-CM

## 2025-03-31 DIAGNOSIS — I10 ESSENTIAL HYPERTENSION: Primary | ICD-10-CM

## 2025-03-31 DIAGNOSIS — F51.02 ADJUSTMENT INSOMNIA: ICD-10-CM

## 2025-03-31 DIAGNOSIS — E66.812 CLASS 2 OBESITY DUE TO EXCESS CALORIES WITHOUT SERIOUS COMORBIDITY WITH BODY MASS INDEX (BMI) OF 37.0 TO 37.9 IN ADULT: ICD-10-CM

## 2025-03-31 DIAGNOSIS — F41.8 PERFORMANCE ANXIETY: ICD-10-CM

## 2025-03-31 PROCEDURE — 99214 OFFICE O/P EST MOD 30 MIN: CPT | Performed by: FAMILY MEDICINE

## 2025-03-31 RX ORDER — OLMESARTAN MEDOXOMIL 20 MG/1
TABLET ORAL
Qty: 90 TABLET | Refills: 0 | Status: SHIPPED | OUTPATIENT
Start: 2025-03-31

## 2025-03-31 RX ORDER — AMLODIPINE BESYLATE 10 MG/1
10 TABLET ORAL DAILY
Qty: 90 TABLET | Refills: 1 | Status: SHIPPED | OUTPATIENT
Start: 2025-03-31

## 2025-03-31 NOTE — PROGRESS NOTES
Subjective   Farzad Collins is a 45 y.o. male.   Chief Complaint   Patient presents with    Hypertension    Insomnia    Performance anxiety       History of Present Illness     Hypertension - on amlodipine at 10 mg a day and Benicar 10 mg a day (he takes half tablet of 20 mg). He takes it daily.  He has no chest pain, no shortness of breath, no lightheadedness, no irregular heart rate.  He lost 40 pounds from September 2023.  He did it by significantly decreasing carbs.  Weight is stable from September 2024.  Creactine at 0.88, .6.  This year he started walking regularly.  He walks 3-5 times a week for an hour.  He had cough on lisinopril.     Insomnia-patient is a .  He is under a lot of stress.  Sometimes he has problems with sleeping.  He tried melatonin, but it did not work.  He is on trazodone 50 mg taken as needed.  He takes half tablet to 1 tablet, he takes it once to twice a month.  It helps.  He has no side effects.  No drowsiness.  He gets ~6-8 hours of sleep.      Performance anxiety-on propranolol 20 mg to be taken prior to presentations.  It works great.  He has no side effects.  No change.    Patient would like to have testosterone tested.  He has a friend who was testosterone deficient and  he lost a lot of weight after treatment was started.   He is on  low-carb diet.  If he introduce carbs he gains a lot of weight very quickly.    Review of Systems   Constitutional:  Negative for chills, diaphoresis, fatigue and fever.   HENT:  Negative for congestion and sore throat.    Respiratory:  Negative for cough and shortness of breath.    Cardiovascular:  Negative for chest pain and palpitations.   Gastrointestinal:  Negative for abdominal pain, nausea and vomiting.   Genitourinary:  Negative for dysuria.   Musculoskeletal:  Negative for myalgias and neck pain.   Skin:  Negative for rash.   Neurological:  Negative for weakness, light-headedness, numbness and headaches.         Objective   Wt  Readings from Last 3 Encounters:   03/31/25 119 kg (263 lb 4.8 oz)   09/24/24 119 kg (263 lb)   04/15/24 126 kg (278 lb)      Vitals:    03/31/25 0847   BP: 110/74   Pulse: 70   Temp: 97.8 °F (36.6 °C)   SpO2: 97%     Temp Readings from Last 3 Encounters:   03/31/25 97.8 °F (36.6 °C)   09/24/24 97.2 °F (36.2 °C)   03/26/24 97.4 °F (36.3 °C)     BP Readings from Last 3 Encounters:   03/31/25 110/74   09/24/24 120/70   04/15/24 125/72     Pulse Readings from Last 3 Encounters:   03/31/25 70   09/24/24 87   03/26/24 76     Body mass index is 37.78 kg/m².    Physical Exam  Constitutional:       Appearance: He is well-developed.   Neck:      Vascular: No carotid bruit.   Cardiovascular:      Rate and Rhythm: Normal rate and regular rhythm.      Heart sounds: Normal heart sounds.   Pulmonary:      Effort: Pulmonary effort is normal.      Breath sounds: Normal breath sounds.   Skin:     General: Skin is warm and dry.   Neurological:      Mental Status: He is alert.   Psychiatric:         Behavior: Behavior normal.         Assessment & Plan   Diagnoses and all orders for this visit:    1. Essential hypertension (Primary)  -     Lipid Panel With LDL / HDL Ratio; Future  -     Basic Metabolic Panel; Future    2. Adjustment insomnia    3. Performance anxiety    4. Class 2 obesity due to excess calories without serious comorbidity with body mass index (BMI) of 37.0 to 37.9 in adult  -     Testosterone; Future  -     Ambulatory Referral to Nutrition Services    Other orders  -     olmesartan (BENICAR) 20 MG tablet; TAKE 1/2 TABLET BY MOUTH EVERY DAY  Dispense: 90 tablet; Refill: 0  -     amLODIPine (NORVASC) 10 MG tablet; Take 1 tablet by mouth Daily.  Dispense: 90 tablet; Refill: 1        HTN-continue current treatment.  Follow-up in 6 months.    Insomnia-continue current treatment.  Follow-up in 6 months.    Performance anxiety-continue current treatment.  Follow-up in 6 to 12 months.    Obesity-referral to nutritionist.   Thyroid test is normal.  Will check testosterone.  Follow-up in 6 months.          Class 2 Severe Obesity (BMI >=35 and <=39.9). Obesity-related health conditions include the following: hypertension. Obesity is unchanged. BMI is is above average; BMI management plan is completed. We discussed  referral to nutritionist .

## 2025-04-01 LAB — TESTOST SERPL-MCNC: 521 NG/DL (ref 264–916)

## 2025-04-07 ENCOUNTER — OFFICE VISIT (OUTPATIENT)
Dept: INTERNAL MEDICINE | Facility: CLINIC | Age: 45
End: 2025-04-07
Payer: COMMERCIAL

## 2025-04-07 VITALS
SYSTOLIC BLOOD PRESSURE: 104 MMHG | TEMPERATURE: 97.5 F | HEIGHT: 70 IN | OXYGEN SATURATION: 98 % | BODY MASS INDEX: 37.51 KG/M2 | HEART RATE: 82 BPM | WEIGHT: 262 LBS | DIASTOLIC BLOOD PRESSURE: 64 MMHG

## 2025-04-07 DIAGNOSIS — Z30.2 REQUEST FOR STERILIZATION: ICD-10-CM

## 2025-04-07 DIAGNOSIS — Z00.00 PHYSICAL EXAM, ANNUAL: Primary | ICD-10-CM

## 2025-04-07 DIAGNOSIS — Z12.11 ENCOUNTER FOR SCREENING FOR MALIGNANT NEOPLASM OF COLON: ICD-10-CM

## 2025-04-07 NOTE — PROGRESS NOTES
Subjective   Farzad Collins is a 45 y.o. male.   Chief Complaint   Patient presents with    Annual Exam       History of Present Illness     He is here for complete physical exam.     CPE     In January he started making changes in his diet.  He eats no sugar.  No pasta and no bread.  He walks once or twice a week for 30 to 40 minutes.  He lost 30 pounds on his home scale since he started doing it.     He reports no ER visits or hospitalizations within the last year.  No change in medical or surgical history from last office visit.  No change in family history.  No change in prescription medications.  Over-the-counter he takes Zyrtec as needed, fiber, multivitamin, calcium with vitamin D3.  He is not allergic to medications.  He does not use tobacco products, he drinks 1-2 drinks a year, no drugs.  Dental appointments every 6 months.  Last eye exam was less than a year ago.  He had flu vaccine this season.  Tetanus vaccine 2019.    Review of Systems      Objective   Wt Readings from Last 3 Encounters:   04/07/25 119 kg (262 lb)   03/31/25 119 kg (263 lb 4.8 oz)   09/24/24 119 kg (263 lb)      Vitals:    04/07/25 0810   BP: 104/64   Pulse: 82   Temp: 97.5 °F (36.4 °C)   SpO2: 98%     Temp Readings from Last 3 Encounters:   04/07/25 97.5 °F (36.4 °C)   03/31/25 97.8 °F (36.6 °C)   09/24/24 97.2 °F (36.2 °C)     BP Readings from Last 3 Encounters:   04/07/25 104/64   03/31/25 110/74   09/24/24 120/70     Pulse Readings from Last 3 Encounters:   04/07/25 82   03/31/25 70   09/24/24 87     Body mass index is 37.59 kg/m².    Physical Exam    Assessment & Plan

## 2025-04-07 NOTE — PROGRESS NOTES
Subjective   Farzad Collins is a 45 y.o. male.   Chief Complaint   Patient presents with    Annual Exam       History of Present Illness    He is here for complete physical exam.     CPE-no complaints.  Patient would like to be referred to urologist for vasectomy.     He reports no ER visits or hospitalizations within the last year.  No change in medical or surgical history from last office visit.  No significant change in family history.  No change in prescription medications.  Over-the-counter -he takes Tylenol as needed, on average few times a year.  He is not allergic to medications.  He does not use tobacco products, he does not drink alcohol, no drugs.  He exercises 3 to 5 days a week for an hour.  He walks.  He started it this year.  Dental appointments every 6 months.  Last eye exam was less than a year ago.  Stable.  He had flu vaccine this season.  Tetanus vaccine 2019.    He has hemorrhoids and rectal bleeding on and off. He was evaluated by Dr. Marin in 2024.  Patient was offered surgery.  He is not sure if he wants to proceed.  Colonoscopy in July 2020.  Next was recommended 5 years later.    Review of Systems   Constitutional:  Negative for chills and fever.   Respiratory:  Negative for shortness of breath.    Cardiovascular:  Negative for chest pain and palpitations.   Gastrointestinal:  Positive for blood in stool.   Genitourinary:  Negative for hematuria.   Neurological:  Negative for light-headedness.   Psychiatric/Behavioral: Negative.  Negative for suicidal ideas.          Objective   Wt Readings from Last 3 Encounters:   04/07/25 119 kg (262 lb)   03/31/25 119 kg (263 lb 4.8 oz)   09/24/24 119 kg (263 lb)      Vitals:    04/07/25 0810   BP: 104/64   Pulse: 82   Temp: 97.5 °F (36.4 °C)   SpO2: 98%     Temp Readings from Last 3 Encounters:   04/07/25 97.5 °F (36.4 °C)   03/31/25 97.8 °F (36.6 °C)   09/24/24 97.2 °F (36.2 °C)     BP Readings from Last 3 Encounters:   04/07/25 104/64   03/31/25  110/74   09/24/24 120/70     Pulse Readings from Last 3 Encounters:   04/07/25 82   03/31/25 70   09/24/24 87     Body mass index is 37.59 kg/m².    Physical Exam  Vitals reviewed.   Constitutional:       General: He is not in acute distress.     Appearance: He is well-developed. He is not diaphoretic.   HENT:      Head: Hair is normal.      Right Ear: Hearing, tympanic membrane, ear canal and external ear normal.      Left Ear: Hearing, tympanic membrane, ear canal and external ear normal.      Nose: No nasal deformity.      Mouth/Throat:      Mouth: No oral lesions.      Pharynx: Uvula midline. No uvula swelling.   Eyes:      General: Lids are normal. No scleral icterus.        Right eye: No discharge.         Left eye: No discharge.      Extraocular Movements:      Right eye: Normal extraocular motion and no nystagmus.      Left eye: Normal extraocular motion and no nystagmus.      Conjunctiva/sclera: Conjunctivae normal.      Pupils: Pupils are equal, round, and reactive to light.   Neck:      Thyroid: No thyromegaly.      Vascular: No JVD.   Cardiovascular:      Rate and Rhythm: Normal rate and regular rhythm.      Pulses: Normal pulses.      Heart sounds: Normal heart sounds. No murmur heard.     No gallop.   Pulmonary:      Effort: Pulmonary effort is normal. No respiratory distress.      Breath sounds: Normal breath sounds. No wheezing or rales.   Chest:      Chest wall: No tenderness.   Abdominal:      General: There is no distension.      Palpations: Abdomen is soft. There is no mass.      Tenderness: There is no abdominal tenderness. There is no guarding.      Hernia: No hernia is present.   Musculoskeletal:         General: No tenderness or deformity. Normal range of motion.      Cervical back: Normal range of motion.   Lymphadenopathy:      Cervical: No cervical adenopathy.      Upper Body:      Right upper body: No supraclavicular adenopathy.      Left upper body: No supraclavicular adenopathy.    Skin:     General: Skin is warm and dry.      Findings: No rash.   Neurological:      Mental Status: He is alert and oriented to person, place, and time.      Cranial Nerves: No cranial nerve deficit.      Motor: No abnormal muscle tone.      Coordination: Coordination normal.      Deep Tendon Reflexes: Reflexes are normal and symmetric. Reflexes normal.   Psychiatric:         Behavior: Behavior normal.         Thought Content: Thought content normal.         Judgment: Judgment normal.         Assessment & Plan   Diagnoses and all orders for this visit:    1. Physical exam, annual (Primary)    2. Encounter for screening for malignant neoplasm of colon  -     Ambulatory Referral For Screening Colonoscopy    3. Request for sterilization  -     Ambulatory Referral to Urology        CPE-preventive counseling provided.  Blood work results reviewed with patient.  Obesity increases risk for developing diabetes and heart disease.  We referred patient to nutritionist last month.  He is awaiting appointment.  Information on exercise to lose weight added to discharge summary.  Continue regular diet appointments at least every 6 months.  He is due for colon cancer screening and we are referring him to have it done.  We are referring him to urologist.  He is requesting vasectomy.  We discussed recommendations for vaccinations including flu vaccine and Covid vaccine.  Sun protection recommended.

## 2025-04-28 RX ORDER — PROPRANOLOL HCL 20 MG
TABLET ORAL
Qty: 30 TABLET | Refills: 1 | Status: SHIPPED | OUTPATIENT
Start: 2025-04-28

## 2025-06-25 RX ORDER — PROPRANOLOL HCL 20 MG
TABLET ORAL
Qty: 30 TABLET | Refills: 1 | OUTPATIENT
Start: 2025-06-25

## 2025-07-07 RX ORDER — TRAZODONE HYDROCHLORIDE 50 MG/1
50 TABLET ORAL NIGHTLY PRN
Qty: 90 TABLET | Refills: 1 | Status: SHIPPED | OUTPATIENT
Start: 2025-07-07